# Patient Record
Sex: FEMALE | Race: OTHER | HISPANIC OR LATINO
[De-identification: names, ages, dates, MRNs, and addresses within clinical notes are randomized per-mention and may not be internally consistent; named-entity substitution may affect disease eponyms.]

---

## 2019-12-16 ENCOUNTER — APPOINTMENT (OUTPATIENT)
Dept: OTOLARYNGOLOGY | Facility: CLINIC | Age: 79
End: 2019-12-16
Payer: MEDICARE

## 2019-12-16 VITALS
DIASTOLIC BLOOD PRESSURE: 84 MMHG | SYSTOLIC BLOOD PRESSURE: 151 MMHG | HEART RATE: 85 BPM | BODY MASS INDEX: 33.86 KG/M2 | WEIGHT: 184 LBS | HEIGHT: 62 IN

## 2019-12-16 DIAGNOSIS — Z86.39 PERSONAL HISTORY OF OTHER ENDOCRINE, NUTRITIONAL AND METABOLIC DISEASE: ICD-10-CM

## 2019-12-16 DIAGNOSIS — H90.3 SENSORINEURAL HEARING LOSS, BILATERAL: ICD-10-CM

## 2019-12-16 DIAGNOSIS — Z82.49 FAMILY HISTORY OF ISCHEMIC HEART DISEASE AND OTHER DISEASES OF THE CIRCULATORY SYSTEM: ICD-10-CM

## 2019-12-16 PROCEDURE — 92557 COMPREHENSIVE HEARING TEST: CPT

## 2019-12-16 PROCEDURE — 92567 TYMPANOMETRY: CPT

## 2019-12-16 PROCEDURE — 99204 OFFICE O/P NEW MOD 45 MIN: CPT | Mod: 25

## 2019-12-16 NOTE — REVIEW OF SYSTEMS
[Ear Itch] : ear itch [Ear Pain] : ear pain [Vertigo] : vertigo [Dizziness] : dizziness [Cough] : cough [Negative] : Heme/Lymph [Patient Intake Form Reviewed] : Patient intake form was reviewed [FreeTextEntry1] : cough  joint aches  watery eyes

## 2019-12-16 NOTE — PHYSICAL EXAM
[Midline] : trachea located in midline position [Normal] : no rashes [de-identified] : tender left ear conchae no lesion [] : Romberg test is negative

## 2019-12-16 NOTE — ASSESSMENT
[FreeTextEntry1] : unclear source of as ear pain\par audio wnl a/a tymps\par hx suggests possible bppv\par rec vng study\par ramon scott exercises

## 2019-12-16 NOTE — HISTORY OF PRESENT ILLNESS
[de-identified] : co pain left ear near outside x 1 mo\par dizziness x 2 mo not spinning but like on rocking boat\par w turning head or looking up daily lasts one minute\par no hearing loss, no focal weakness or numbness\par cva x 3 in past residual left arm weakness but resolved

## 2019-12-16 NOTE — CONSULT LETTER
[FreeTextEntry1] : Dear Dr. TOM GIRON,\par \par Thank you for your kind referral. Please refer to my enclosed office notes for HETAL LUNA . If there are any questions free to contact me.\par  [Sincerely,] : Sincerely, [FreeTextEntry3] : Wilmer Dinh MD, FACS\par

## 2020-01-30 ENCOUNTER — APPOINTMENT (OUTPATIENT)
Dept: OTOLARYNGOLOGY | Facility: CLINIC | Age: 80
End: 2020-01-30
Payer: MEDICARE

## 2020-01-30 PROCEDURE — 92537 CALORIC VSTBLR TEST W/REC: CPT

## 2020-01-30 PROCEDURE — 92533 CALORIC VESTIBULAR TEST: CPT

## 2020-02-25 ENCOUNTER — APPOINTMENT (OUTPATIENT)
Dept: NEUROLOGY | Facility: CLINIC | Age: 80
End: 2020-02-25
Payer: MEDICARE

## 2020-02-25 VITALS
WEIGHT: 182 LBS | SYSTOLIC BLOOD PRESSURE: 137 MMHG | HEIGHT: 62 IN | BODY MASS INDEX: 33.49 KG/M2 | HEART RATE: 71 BPM | DIASTOLIC BLOOD PRESSURE: 84 MMHG

## 2020-02-25 DIAGNOSIS — Z83.49 FAMILY HISTORY OF OTHER ENDOCRINE, NUTRITIONAL AND METABOLIC DISEASES: ICD-10-CM

## 2020-02-25 DIAGNOSIS — Z84.89 FAMILY HISTORY OF OTHER SPECIFIED CONDITIONS: ICD-10-CM

## 2020-02-25 DIAGNOSIS — Z83.3 FAMILY HISTORY OF DIABETES MELLITUS: ICD-10-CM

## 2020-02-25 DIAGNOSIS — Z82.3 FAMILY HISTORY OF STROKE: ICD-10-CM

## 2020-02-25 DIAGNOSIS — Z86.79 PERSONAL HISTORY OF OTHER DISEASES OF THE CIRCULATORY SYSTEM: ICD-10-CM

## 2020-02-25 DIAGNOSIS — Z86.73 PERSONAL HISTORY OF TRANSIENT ISCHEMIC ATTACK (TIA), AND CEREBRAL INFARCTION W/OUT RESIDUAL DEFICITS: ICD-10-CM

## 2020-02-25 PROCEDURE — 99204 OFFICE O/P NEW MOD 45 MIN: CPT

## 2020-02-25 NOTE — DISCUSSION/SUMMARY
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

## 2020-02-25 NOTE — PHYSICAL EXAM
[General Appearance - Alert] : alert [General Appearance - In No Acute Distress] : in no acute distress [Oriented To Time, Place, And Person] : oriented to person, place, and time [Affect] : the affect was normal [Person] : oriented to person [Impaired Insight] : insight and judgment were intact [Time] : oriented to time [Place] : oriented to place [Concentration Intact] : normal concentrating ability [Visual Intact] : visual attention was ~T not ~L decreased [Naming Objects] : no difficulty naming common objects [Repeating Phrases] : no difficulty repeating a phrase [Fluency] : fluency intact [Comprehension] : comprehension intact [Writing A Sentence] : no difficulty writing a sentence [Reading] : reading intact [Cranial Nerves Optic (II)] : visual acuity intact bilaterally,  visual fields full to confrontation, pupils equal round and reactive to light [Past History] : adequate knowledge of personal past history [Cranial Nerves Trigeminal (V)] : facial sensation intact symmetrically [Cranial Nerves Vestibulocochlear (VIII)] : hearing was intact bilaterally [Cranial Nerves Oculomotor (III)] : extraocular motion intact [Cranial Nerves Accessory (XI - Cranial And Spinal)] : head turning and shoulder shrug symmetric [Cranial Nerves Glossopharyngeal (IX)] : tongue and palate midline [Cranial Nerves Hypoglossal (XII)] : there was no tongue deviation with protrusion [Cranial Nerves Left Facial: House Grade ___(1-6)] : grade I (100%) facial nerve function [Flattening Of Nasolabial Fold On The Left] : flattening of the  nasolabial fold [No Muscle Atrophy] : normal bulk in all four extremities [Sensation Tactile Decrease] : light touch was intact [2+] : Brachioradialis right 2+ [3+] : Brachioradialis left 3+ [1+] : Patella left 1+ [0] : Ankle jerk right 0 [PERRL With Normal Accommodation] : pupils were equal in size, round, reactive to light, with normal accommodation [Full Visual Field] : full visual field [Pain / Temp Decrease Distal Extremities (Glove & Stocking)] : diminished stocking/glove distribution [Hemiparesis Of Left Side] : hemiparesis was present on the left [Proprioception] : proprioception was intact [Vibration Decrease Leg / Foot Both Ankles] : decreased at both ankles [Vibration Decrease Leg / Foot Toes Both Feet] : decreased at the toes of both feet  [Outer Ear] : the ears and nose were normal in appearance [Extraocular Movements] : extraocular movements were intact [Neck Cervical Mass (___cm)] : no neck mass was observed [Heart Rate And Rhythm] : heart rate was normal and rhythm regular [Auscultation Breath Sounds / Voice Sounds] : lungs were clear to auscultation bilaterally [Heart Sounds] : normal S1 and S2 [Arterial Pulses Carotid] : carotid pulses were normal with no bruits [Involuntary Movements] : no involuntary movements were seen [Edema] : there was no peripheral edema [] : no rash [FreeTextEntry1] : obese [Past-pointing] : there was no past-pointing [Tremor] : no tremor present [Plantar Reflex Right Only] : normal on the right [Plantar Reflex Left Only] : normal on the left [FreeTextEntry6] : Motor: Right upper and lower extremity 5/5, left spastic hemiparesis with significant restriction of left upper extremity proximal movements [FreeTextEntry8] : Unstable; Mild circumduction gait

## 2020-02-25 NOTE — REVIEW OF SYSTEMS
[Arm Weakness] : arm weakness [Leg Weakness] : leg weakness [Poor Coordination] : poor coordination [Dizziness] : dizziness [Earache] : earache [Lightheadedness] : lightheadedness [Joint Pain] : joint pain [Constipation] : constipation [Negative] : Integumentary

## 2020-02-25 NOTE — HISTORY OF PRESENT ILLNESS
[FreeTextEntry1] : 79-year-old right-handed  female with past medical history remarkable for hyperlipidemia, hypertension and hypothyroidism; has come in accompanied by her son for evaluation of intermittent dizziness, left ear pain and abnormal VNG results suggestive of central etiology.\par \par Patient reports that since past 3 months, she has been experiencing dizziness, dizziness usually occurs when she turns on her side from supine position, intermittently it occurs in sitting position with neck movements, the episodes brief, last few seconds, resolved spontaneously, have been occurring few times almost daily. Patient denies dizziness while bending, denies associated spinning sensation, tinnitus, nausea, vomiting, hearing loss, double vision or visual blurring. She does admit that since past 2 months she has also been having left-sided ear ache, she was seen by Dr. Dinh ENT specialist, audiological testing and VNG were done, report is suggestive of central etiology.\par \par Upon obtaining further history, patient has left arm proximal weakness with severely restricted shoulder ROM, does not recall details but being recalls being admitted to [facility] with stroke 4 years ago, since then she has been having mildly unstable gait, patient was on aspirin, apparently discontinued it as it caused gastric irritation.

## 2020-02-25 NOTE — HISTORY OF PRESENT ILLNESS
[FreeTextEntry1] : 79-year-old right-handed  female with past medical history remarkable for hyperlipidemia, hypertension and hypothyroidism; has come in accompanied by her son for evaluation of intermittent dizziness, left ear pain and abnormal VNG results suggestive of central etiology.\par \par Patient reports that since past 3 months, she has been experiencing dizziness, dizziness usually occurs when she turns on her side from supine position, intermittently it occurs in sitting position with neck movements, the episodes brief, last few seconds, resolved spontaneously, have been occurring few times almost daily. Patient denies dizziness while bending, denies associated spinning sensation, tinnitus, nausea, vomiting, hearing loss, double vision or visual blurring. She does admit that since past 2 months she has also been having left-sided ear ache, she was seen by Dr. Dinh ENT specialist, audiological testing and VNG were done, report is suggestive of central etiology.\par \par Upon obtaining further history, patient has left arm proximal weakness with severely restricted shoulder ROM, does not recall details but being recalls being admitted to [facility] with stroke 4 years ago, since then she has been having mildly unstable gait, patient was on aspirin, apparently discontinued it as it caused gastric irritation.

## 2020-02-25 NOTE — PHYSICAL EXAM
[General Appearance - Alert] : alert [Oriented To Time, Place, And Person] : oriented to person, place, and time [General Appearance - In No Acute Distress] : in no acute distress [Person] : oriented to person [Impaired Insight] : insight and judgment were intact [Affect] : the affect was normal [Place] : oriented to place [Time] : oriented to time [Concentration Intact] : normal concentrating ability [Visual Intact] : visual attention was ~T not ~L decreased [Naming Objects] : no difficulty naming common objects [Repeating Phrases] : no difficulty repeating a phrase [Fluency] : fluency intact [Comprehension] : comprehension intact [Writing A Sentence] : no difficulty writing a sentence [Cranial Nerves Optic (II)] : visual acuity intact bilaterally,  visual fields full to confrontation, pupils equal round and reactive to light [Reading] : reading intact [Past History] : adequate knowledge of personal past history [Cranial Nerves Trigeminal (V)] : facial sensation intact symmetrically [Cranial Nerves Vestibulocochlear (VIII)] : hearing was intact bilaterally [Cranial Nerves Oculomotor (III)] : extraocular motion intact [Cranial Nerves Accessory (XI - Cranial And Spinal)] : head turning and shoulder shrug symmetric [Cranial Nerves Hypoglossal (XII)] : there was no tongue deviation with protrusion [Cranial Nerves Glossopharyngeal (IX)] : tongue and palate midline [Flattening Of Nasolabial Fold On The Left] : flattening of the  nasolabial fold [Cranial Nerves Left Facial: House Grade ___(1-6)] : grade I (100%) facial nerve function [Sensation Tactile Decrease] : light touch was intact [No Muscle Atrophy] : normal bulk in all four extremities [2+] : Brachioradialis right 2+ [3+] : Brachioradialis left 3+ [1+] : Patella left 1+ [0] : Ankle jerk right 0 [Full Visual Field] : full visual field [PERRL With Normal Accommodation] : pupils were equal in size, round, reactive to light, with normal accommodation [Proprioception] : proprioception was intact [Vibration Decrease Leg / Foot Both Ankles] : decreased at both ankles [Hemiparesis Of Left Side] : hemiparesis was present on the left [Pain / Temp Decrease Distal Extremities (Glove & Stocking)] : diminished stocking/glove distribution [Vibration Decrease Leg / Foot Toes Both Feet] : decreased at the toes of both feet  [Outer Ear] : the ears and nose were normal in appearance [Extraocular Movements] : extraocular movements were intact [Neck Cervical Mass (___cm)] : no neck mass was observed [Heart Rate And Rhythm] : heart rate was normal and rhythm regular [Auscultation Breath Sounds / Voice Sounds] : lungs were clear to auscultation bilaterally [Involuntary Movements] : no involuntary movements were seen [Heart Sounds] : normal S1 and S2 [Arterial Pulses Carotid] : carotid pulses were normal with no bruits [Edema] : there was no peripheral edema [] : no rash [FreeTextEntry1] : obese [Past-pointing] : there was no past-pointing [Tremor] : no tremor present [Plantar Reflex Right Only] : normal on the right [Plantar Reflex Left Only] : normal on the left [FreeTextEntry6] : Motor: Right upper and lower extremity 5/5, left spastic hemiparesis with significant restriction of left upper extremity proximal movements [FreeTextEntry8] : Unstable; Mild circumduction gait

## 2020-02-25 NOTE — DATA REVIEWED
[de-identified] : 2/25/13: MRI brain with/without contrast: acute lacunar infarction in the posterior right corona radiata extending into the right cortico- spinal tract. Scattered punctate foci of hemosiderin in the left cerebellum and bilateral thalami likely due to prior subclinical hypertensive microhemorrhages, old lacunar infarction in the left cerebellum and thalami [de-identified] : Labs:1/28/20: A1c 7.4, B12 283, total cholesterol 196, HDL 46, \par 1/30/20: VNG - suggestive of central pathology

## 2020-02-25 NOTE — DATA REVIEWED
[de-identified] : Labs:1/28/20: A1c 7.4, B12 283, total cholesterol 196, HDL 46, \par 1/30/20: VNG - suggestive of central pathology [de-identified] : 2/25/13: MRI brain with/without contrast: acute lacunar infarction in the posterior right corona radiata extending into the right cortico- spinal tract. Scattered punctate foci of hemosiderin in the left cerebellum and bilateral thalami likely due to prior subclinical hypertensive microhemorrhages, old lacunar infarction in the left cerebellum and thalami

## 2020-02-25 NOTE — REASON FOR VISIT
[Consultation] : a consultation visit [Family Member] : family member [FreeTextEntry1] : Referred by Dr. Rojas for evaluation of dizziness and left ear pain

## 2020-02-25 NOTE — REVIEW OF SYSTEMS
[Arm Weakness] : arm weakness [Leg Weakness] : leg weakness [Poor Coordination] : poor coordination [Lightheadedness] : lightheadedness [Earache] : earache [Dizziness] : dizziness [Joint Pain] : joint pain [Constipation] : constipation [Negative] : Integumentary

## 2020-03-06 ENCOUNTER — FORM ENCOUNTER (OUTPATIENT)
Age: 80
End: 2020-03-06

## 2020-03-07 ENCOUNTER — OUTPATIENT (OUTPATIENT)
Dept: OUTPATIENT SERVICES | Facility: HOSPITAL | Age: 80
LOS: 1 days | End: 2020-03-07
Payer: MEDICARE

## 2020-03-07 ENCOUNTER — APPOINTMENT (OUTPATIENT)
Dept: MRI IMAGING | Facility: CLINIC | Age: 80
End: 2020-03-07
Payer: MEDICARE

## 2020-03-07 DIAGNOSIS — Z00.8 ENCOUNTER FOR OTHER GENERAL EXAMINATION: ICD-10-CM

## 2020-03-07 PROCEDURE — 70544 MR ANGIOGRAPHY HEAD W/O DYE: CPT | Mod: 26,59

## 2020-03-07 PROCEDURE — 70544 MR ANGIOGRAPHY HEAD W/O DYE: CPT

## 2020-03-07 PROCEDURE — 70551 MRI BRAIN STEM W/O DYE: CPT | Mod: 26

## 2020-03-07 PROCEDURE — 70551 MRI BRAIN STEM W/O DYE: CPT

## 2020-05-18 ENCOUNTER — APPOINTMENT (OUTPATIENT)
Dept: NEUROLOGY | Facility: CLINIC | Age: 80
End: 2020-05-18
Payer: MEDICARE

## 2020-05-18 PROCEDURE — 99442: CPT

## 2021-01-05 ENCOUNTER — TRANSCRIPTION ENCOUNTER (OUTPATIENT)
Age: 81
End: 2021-01-05

## 2021-08-13 ENCOUNTER — APPOINTMENT (OUTPATIENT)
Dept: FAMILY MEDICINE | Facility: CLINIC | Age: 81
End: 2021-08-13
Payer: MEDICARE

## 2021-08-13 ENCOUNTER — NON-APPOINTMENT (OUTPATIENT)
Age: 81
End: 2021-08-13

## 2021-08-13 VITALS
OXYGEN SATURATION: 89 % | BODY MASS INDEX: 32.2 KG/M2 | TEMPERATURE: 98.1 F | WEIGHT: 175 LBS | SYSTOLIC BLOOD PRESSURE: 137 MMHG | DIASTOLIC BLOOD PRESSURE: 84 MMHG | HEIGHT: 62 IN | HEART RATE: 79 BPM

## 2021-08-13 DIAGNOSIS — G89.29 PAIN IN RIGHT KNEE: ICD-10-CM

## 2021-08-13 DIAGNOSIS — M25.561 PAIN IN RIGHT KNEE: ICD-10-CM

## 2021-08-13 DIAGNOSIS — Z63.4 DISAPPEARANCE AND DEATH OF FAMILY MEMBER: ICD-10-CM

## 2021-08-13 DIAGNOSIS — K25.6 CHRONIC OR UNSPECIFIED GASTRIC ULCER WITH BOTH HEMORRHAGE AND PERFORATION: ICD-10-CM

## 2021-08-13 DIAGNOSIS — M25.562 PAIN IN RIGHT KNEE: ICD-10-CM

## 2021-08-13 PROCEDURE — 99203 OFFICE O/P NEW LOW 30 MIN: CPT

## 2021-08-13 RX ORDER — ENALAPRIL MALEATE 20 MG/1
20 TABLET ORAL
Refills: 0 | Status: DISCONTINUED | COMMUNITY
End: 2021-08-13

## 2021-08-13 RX ORDER — ASPIRIN 81 MG/1
81 TABLET ORAL DAILY
Qty: 30 | Refills: 0 | Status: DISCONTINUED | COMMUNITY
Start: 2020-02-25 | End: 2021-08-13

## 2021-08-13 RX ORDER — MECLIZINE HYDROCHLORIDE 12.5 MG/1
12.5 TABLET ORAL
Refills: 0 | Status: DISCONTINUED | COMMUNITY
End: 2021-08-13

## 2021-08-13 SDOH — SOCIAL STABILITY - SOCIAL INSECURITY: DISSAPEARANCE AND DEATH OF FAMILY MEMBER: Z63.4

## 2021-08-13 NOTE — HEALTH RISK ASSESSMENT
[1 or 2 (0 pts)] : 1 or 2 (0 points) [Never (0 pts)] : Never (0 points) [No] : In the past 12 months have you used drugs other than those required for medical reasons? No [Two or more falls in past year] : Patient reported two or more falls in the past year [0] : 1) Little interest or pleasure doing things: Not at all (0) [1] : 2) Feeling down, depressed, or hopeless for several days (1) [] : No [Audit-CScore] : 0 [[id number]] : 1

## 2021-08-13 NOTE — HISTORY OF PRESENT ILLNESS
[Other: _____] : [unfilled] [FreeTextEntry8] : Chrissie Foss is a 81 year old Mosotho speaking female here to establish care. Daughter states her previous PCP was Erick Rojas in [place].\par

## 2021-08-13 NOTE — PLAN
[FreeTextEntry1] : Plan for CPE when due. Patient gets routine blood work every 3 months. Next due in few weeks as per patient's daughter.

## 2021-09-03 ENCOUNTER — APPOINTMENT (OUTPATIENT)
Dept: FAMILY MEDICINE | Facility: CLINIC | Age: 81
End: 2021-09-03
Payer: MEDICARE

## 2021-09-03 VITALS
HEIGHT: 62 IN | DIASTOLIC BLOOD PRESSURE: 86 MMHG | OXYGEN SATURATION: 97 % | WEIGHT: 175 LBS | HEART RATE: 74 BPM | TEMPERATURE: 98 F | BODY MASS INDEX: 32.2 KG/M2 | SYSTOLIC BLOOD PRESSURE: 165 MMHG

## 2021-09-03 PROCEDURE — 99214 OFFICE O/P EST MOD 30 MIN: CPT | Mod: 25

## 2021-09-09 LAB
ALBUMIN SERPL ELPH-MCNC: 4.4 G/DL
ALP BLD-CCNC: 109 U/L
ALT SERPL-CCNC: 6 U/L
ANION GAP SERPL CALC-SCNC: 11 MMOL/L
AST SERPL-CCNC: 13 U/L
BILIRUB SERPL-MCNC: 0.2 MG/DL
BUN SERPL-MCNC: 9 MG/DL
CALCIUM SERPL-MCNC: 10.2 MG/DL
CHLORIDE SERPL-SCNC: 101 MMOL/L
CO2 SERPL-SCNC: 27 MMOL/L
CREAT SERPL-MCNC: 0.61 MG/DL
CREAT SPEC-SCNC: 88 MG/DL
ESTIMATED AVERAGE GLUCOSE: 160 MG/DL
GLUCOSE SERPL-MCNC: 127 MG/DL
HBA1C MFR BLD HPLC: 7.2 %
MICROALBUMIN 24H UR DL<=1MG/L-MCNC: 1.3 MG/DL
MICROALBUMIN/CREAT 24H UR-RTO: 14 MG/G
POTASSIUM SERPL-SCNC: 4.7 MMOL/L
PROT SERPL-MCNC: 7.4 G/DL
SODIUM SERPL-SCNC: 139 MMOL/L

## 2021-09-09 NOTE — HISTORY OF PRESENT ILLNESS
[Most Recent A1C: ___] : Most recent A1C was [unfilled] [Does not check BP] : The patient is not checking blood pressure [Target goal not met] : BP target goal not met [Diabetes Mellitus] : Diabetes Mellitus [Hypertension] : Hypertension [Other: _____] : [unfilled] [de-identified] : Taking Metformin 500mg daily in the morning.  [de-identified] : Currently taking Enalapril 5mg 2x/daily.

## 2021-09-10 ENCOUNTER — APPOINTMENT (OUTPATIENT)
Dept: FAMILY MEDICINE | Facility: CLINIC | Age: 81
End: 2021-09-10
Payer: MEDICARE

## 2021-09-10 VITALS
SYSTOLIC BLOOD PRESSURE: 138 MMHG | BODY MASS INDEX: 32.2 KG/M2 | DIASTOLIC BLOOD PRESSURE: 82 MMHG | WEIGHT: 175 LBS | HEART RATE: 99 BPM | HEIGHT: 62 IN | OXYGEN SATURATION: 97 % | TEMPERATURE: 98.4 F

## 2021-09-10 PROCEDURE — 99213 OFFICE O/P EST LOW 20 MIN: CPT

## 2021-09-10 NOTE — PLAN
[FreeTextEntry1] : Continue current medications. \par Follow up in 3 months for  blood work (CBC, A1C, CMP, Lipid, TSH, U/A)\par Counseling provided on diabetic diet.

## 2021-12-03 ENCOUNTER — APPOINTMENT (OUTPATIENT)
Dept: FAMILY MEDICINE | Facility: CLINIC | Age: 81
End: 2021-12-03
Payer: MEDICARE

## 2021-12-03 ENCOUNTER — NON-APPOINTMENT (OUTPATIENT)
Age: 81
End: 2021-12-03

## 2021-12-03 VITALS
BODY MASS INDEX: 32.2 KG/M2 | HEIGHT: 62 IN | OXYGEN SATURATION: 98 % | DIASTOLIC BLOOD PRESSURE: 92 MMHG | SYSTOLIC BLOOD PRESSURE: 154 MMHG | WEIGHT: 175 LBS | HEART RATE: 82 BPM | TEMPERATURE: 98.1 F

## 2021-12-03 VITALS — SYSTOLIC BLOOD PRESSURE: 130 MMHG | DIASTOLIC BLOOD PRESSURE: 85 MMHG

## 2021-12-03 PROCEDURE — 36415 COLL VENOUS BLD VENIPUNCTURE: CPT

## 2021-12-03 PROCEDURE — 81003 URINALYSIS AUTO W/O SCOPE: CPT | Mod: QW

## 2021-12-03 PROCEDURE — 99214 OFFICE O/P EST MOD 30 MIN: CPT | Mod: 25

## 2021-12-03 PROCEDURE — 93000 ELECTROCARDIOGRAM COMPLETE: CPT

## 2021-12-03 RX ORDER — GABAPENTIN 300 MG/1
300 CAPSULE ORAL TWICE DAILY
Refills: 0 | Status: DISCONTINUED | COMMUNITY
End: 2021-12-03

## 2021-12-05 LAB
BILIRUB UR QL STRIP: NORMAL
CLARITY UR: CLEAR
GLUCOSE UR-MCNC: NORMAL
HCG UR QL: 1 EU/DL
HGB UR QL STRIP.AUTO: NORMAL
KETONES UR-MCNC: NORMAL
LEUKOCYTE ESTERASE UR QL STRIP: NORMAL
NITRITE UR QL STRIP: NORMAL
PH UR STRIP: 7
PROT UR STRIP-MCNC: NORMAL
SP GR UR STRIP: 1.02

## 2021-12-06 LAB
ALBUMIN SERPL ELPH-MCNC: 4 G/DL
ALP BLD-CCNC: 114 U/L
ALT SERPL-CCNC: 6 U/L
ANION GAP SERPL CALC-SCNC: 12 MMOL/L
APPEARANCE: ABNORMAL
AST SERPL-CCNC: 9 U/L
BACTERIA: ABNORMAL
BASOPHILS # BLD AUTO: 0.04 K/UL
BASOPHILS NFR BLD AUTO: 0.5 %
BILIRUB SERPL-MCNC: 0.2 MG/DL
BILIRUBIN URINE: NEGATIVE
BLOOD URINE: NEGATIVE
BUN SERPL-MCNC: 9 MG/DL
CALCIUM SERPL-MCNC: 9.6 MG/DL
CHLORIDE SERPL-SCNC: 103 MMOL/L
CO2 SERPL-SCNC: 24 MMOL/L
COLOR: YELLOW
CREAT SERPL-MCNC: 0.65 MG/DL
CREAT SPEC-SCNC: 141 MG/DL
EOSINOPHIL # BLD AUTO: 0.62 K/UL
EOSINOPHIL NFR BLD AUTO: 7.5 %
ESTIMATED AVERAGE GLUCOSE: 163 MG/DL
GLUCOSE QUALITATIVE U: NEGATIVE
GLUCOSE SERPL-MCNC: 146 MG/DL
HBA1C MFR BLD HPLC: 7.3 %
HCT VFR BLD CALC: 42.9 %
HGB BLD-MCNC: 13 G/DL
HYALINE CASTS: 2 /LPF
IMM GRANULOCYTES NFR BLD AUTO: 0.2 %
KETONES URINE: NEGATIVE
LEUKOCYTE ESTERASE URINE: NEGATIVE
LYMPHOCYTES # BLD AUTO: 1.4 K/UL
LYMPHOCYTES NFR BLD AUTO: 17 %
MAN DIFF?: NORMAL
MCHC RBC-ENTMCNC: 25.9 PG
MCHC RBC-ENTMCNC: 30.3 GM/DL
MCV RBC AUTO: 85.5 FL
MICROALBUMIN 24H UR DL<=1MG/L-MCNC: <1.2 MG/DL
MICROALBUMIN/CREAT 24H UR-RTO: NORMAL MG/G
MICROSCOPIC-UA: NORMAL
MONOCYTES # BLD AUTO: 0.62 K/UL
MONOCYTES NFR BLD AUTO: 7.5 %
NEUTROPHILS # BLD AUTO: 5.54 K/UL
NEUTROPHILS NFR BLD AUTO: 67.3 %
NITRITE URINE: NEGATIVE
PH URINE: 7.5
PLATELET # BLD AUTO: 305 K/UL
POTASSIUM SERPL-SCNC: 4.5 MMOL/L
PROT SERPL-MCNC: 7 G/DL
PROTEIN URINE: NORMAL
RBC # BLD: 5.02 M/UL
RBC # FLD: 19.3 %
RED BLOOD CELLS URINE: 1 /HPF
SODIUM SERPL-SCNC: 139 MMOL/L
SPECIFIC GRAVITY URINE: 1.02
SQUAMOUS EPITHELIAL CELLS: 1 /HPF
UROBILINOGEN URINE: NORMAL
WBC # FLD AUTO: 8.24 K/UL
WHITE BLOOD CELLS URINE: 1 /HPF

## 2021-12-06 NOTE — CURRENT MEDS
[Takes medication as prescribed] : takes [FreeTextEntry1] : Forgot to take medications this morning.

## 2021-12-06 NOTE — PLAN
[FreeTextEntry1] : -Patient optimized for surgery pending cardiac clearance.\par -Patient advised to remain NPO after midnight prior to surgery. \par -Risks/benefits of surgery have been discussed by operative physician. \par -Patient has been advised to avoid aspirin or aspirin containing products from now until surgery. \par -Thank you for including me in the care of your patient.\par

## 2021-12-06 NOTE — RESULTS/DATA
[] : results reviewed [de-identified] : abnormal, paced [de-identified] : Requested most recent cardio chart. Seen by cardio 4/23/2021. 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

## 2021-12-06 NOTE — ASSESSMENT
[No Further Testing Recommended] : no further testing recommended [Patient NOT optimized for Surgery at this time] : Patient not optimized for surgery at this time [Cardiology consultation] : Cardiology consultation

## 2021-12-06 NOTE — CONSULT LETTER
[Dear  ___] : Dear ~BIGG, [Consult Letter:] : I had the pleasure of evaluating your patient, [unfilled]. [Please see my note below.] : Please see my note below. [Consult Closing:] : Thank you very much for allowing me to participate in the care of this patient.  If you have any questions, please do not hesitate to contact me. [Sincerely,] : Sincerely, [FreeTextEntry3] : Andreas Quintanilla DO\par Diplomate of [employer]\par  at [employer] at [facility]\par

## 2021-12-06 NOTE — HISTORY OF PRESENT ILLNESS
[Implantable Device/Pacemaker] : implantable device/pacemaker [No Pertinent Pulmonary History] : no history of asthma, COPD, sleep apnea, or smoking [No Adverse Anesthesia Reaction] : no adverse anesthesia reaction in self or family member [(Patient denies any chest pain, claudication, dyspnea on exertion, orthopnea, palpitations or syncope)] : Patient denies any chest pain, claudication, dyspnea on exertion, orthopnea, palpitations or syncope [Aortic Stenosis] : no aortic stenosis [Atrial Fibrillation] : no atrial fibrillation [Coronary Artery Disease] : no coronary artery disease [Recent Myocardial Infarction] : no recent myocardial infarction [FreeTextEntry1] : Upper Endoscopy  [FreeTextEntry2] : 12/10/2021 [FreeTextEntry3] : Dr. Barrett [FreeTextEntry4] : Cardio: Kamran Hale

## 2021-12-27 ENCOUNTER — TRANSCRIPTION ENCOUNTER (OUTPATIENT)
Age: 81
End: 2021-12-27

## 2022-02-04 ENCOUNTER — APPOINTMENT (OUTPATIENT)
Dept: FAMILY MEDICINE | Facility: CLINIC | Age: 82
End: 2022-02-04
Payer: MEDICARE

## 2022-02-04 VITALS
WEIGHT: 175 LBS | BODY MASS INDEX: 32.2 KG/M2 | DIASTOLIC BLOOD PRESSURE: 95 MMHG | TEMPERATURE: 97.8 F | HEIGHT: 62 IN | SYSTOLIC BLOOD PRESSURE: 161 MMHG | OXYGEN SATURATION: 98 % | HEART RATE: 84 BPM

## 2022-02-04 LAB
BILIRUB UR QL STRIP: NORMAL
CLARITY UR: CLEAR
COLLECTION METHOD: NORMAL
GLUCOSE UR-MCNC: NORMAL
HCG UR QL: 0.2 EU/DL
HGB UR QL STRIP.AUTO: NORMAL
KETONES UR-MCNC: NORMAL
LEUKOCYTE ESTERASE UR QL STRIP: NORMAL
NITRITE UR QL STRIP: NORMAL
PH UR STRIP: 7
PROT UR STRIP-MCNC: NORMAL
SP GR UR STRIP: 1.02

## 2022-02-04 PROCEDURE — 99213 OFFICE O/P EST LOW 20 MIN: CPT | Mod: 25

## 2022-02-04 PROCEDURE — 36415 COLL VENOUS BLD VENIPUNCTURE: CPT

## 2022-02-04 PROCEDURE — 81003 URINALYSIS AUTO W/O SCOPE: CPT | Mod: QW

## 2022-02-11 ENCOUNTER — NON-APPOINTMENT (OUTPATIENT)
Age: 82
End: 2022-02-11

## 2022-02-11 LAB
ALBUMIN SERPL ELPH-MCNC: 4.7 G/DL
ALP BLD-CCNC: 120 U/L
ALT SERPL-CCNC: 10 U/L
ANION GAP SERPL CALC-SCNC: 13 MMOL/L
APPEARANCE: CLEAR
AST SERPL-CCNC: 18 U/L
BACTERIA: NEGATIVE
BASOPHILS # BLD AUTO: 0.05 K/UL
BASOPHILS NFR BLD AUTO: 0.6 %
BILIRUB SERPL-MCNC: 0.4 MG/DL
BILIRUBIN URINE: NEGATIVE
BLOOD URINE: NEGATIVE
BUN SERPL-MCNC: 7 MG/DL
CALCIUM OXALATE CRYSTALS: NEGATIVE
CALCIUM SERPL-MCNC: 9.8 MG/DL
CHLORIDE SERPL-SCNC: 100 MMOL/L
CHOLEST SERPL-MCNC: 147 MG/DL
CO2 SERPL-SCNC: 27 MMOL/L
COLOR: NORMAL
CREAT SERPL-MCNC: 0.62 MG/DL
CREAT SPEC-SCNC: 100 MG/DL
EOSINOPHIL # BLD AUTO: 0.09 K/UL
EOSINOPHIL NFR BLD AUTO: 1.1 %
ESTIMATED AVERAGE GLUCOSE: 171 MG/DL
GLUCOSE QUALITATIVE U: NEGATIVE
GLUCOSE SERPL-MCNC: 142 MG/DL
GRANULAR CASTS: 0 /LPF
HBA1C MFR BLD HPLC: 7.6 %
HCT VFR BLD CALC: 43 %
HDLC SERPL-MCNC: 43 MG/DL
HGB BLD-MCNC: 13.1 G/DL
HYALINE CASTS: 0 /LPF
IMM GRANULOCYTES NFR BLD AUTO: 0.2 %
KETONES URINE: NEGATIVE
LDLC SERPL CALC-MCNC: 81 MG/DL
LEUKOCYTE ESTERASE URINE: NEGATIVE
LYMPHOCYTES # BLD AUTO: 1.6 K/UL
LYMPHOCYTES NFR BLD AUTO: 18.9 %
MAN DIFF?: NORMAL
MCHC RBC-ENTMCNC: 26.3 PG
MCHC RBC-ENTMCNC: 30.5 GM/DL
MCV RBC AUTO: 86.2 FL
MICROALBUMIN 24H UR DL<=1MG/L-MCNC: 1.8 MG/DL
MICROALBUMIN/CREAT 24H UR-RTO: 17 MG/G
MICROSCOPIC-UA: NORMAL
MONOCYTES # BLD AUTO: 0.65 K/UL
MONOCYTES NFR BLD AUTO: 7.7 %
NEUTROPHILS # BLD AUTO: 6.04 K/UL
NEUTROPHILS NFR BLD AUTO: 71.5 %
NITRITE URINE: NEGATIVE
NONHDLC SERPL-MCNC: 104 MG/DL
PH URINE: 7
PLATELET # BLD AUTO: 321 K/UL
POTASSIUM SERPL-SCNC: 4.3 MMOL/L
PROT SERPL-MCNC: 7.6 G/DL
PROTEIN URINE: NEGATIVE
RBC # BLD: 4.99 M/UL
RBC # FLD: 16.4 %
RED BLOOD CELLS URINE: 2 /HPF
SODIUM SERPL-SCNC: 140 MMOL/L
SPECIFIC GRAVITY URINE: 1.01
SQUAMOUS EPITHELIAL CELLS: 4 /HPF
T3FREE SERPL-MCNC: 3.02 PG/ML
T4 FREE SERPL-MCNC: 2 NG/DL
TRIGL SERPL-MCNC: 114 MG/DL
TRIPLE PHOSPHATE CRYSTALS: NEGATIVE
TSH SERPL-ACNC: 0.25 UIU/ML
URIC ACID CRYSTALS: NEGATIVE
URINE COMMENTS: NORMAL
UROBILINOGEN URINE: NORMAL
WBC # FLD AUTO: 8.45 K/UL
WHITE BLOOD CELLS URINE: 6 /HPF

## 2022-02-24 ENCOUNTER — RX RENEWAL (OUTPATIENT)
Age: 82
End: 2022-02-24

## 2022-03-22 ENCOUNTER — RX RENEWAL (OUTPATIENT)
Age: 82
End: 2022-03-22

## 2022-05-06 ENCOUNTER — APPOINTMENT (OUTPATIENT)
Dept: FAMILY MEDICINE | Facility: CLINIC | Age: 82
End: 2022-05-06
Payer: MEDICARE

## 2022-05-06 VITALS
SYSTOLIC BLOOD PRESSURE: 153 MMHG | BODY MASS INDEX: 32.2 KG/M2 | OXYGEN SATURATION: 100 % | HEIGHT: 62 IN | HEART RATE: 77 BPM | TEMPERATURE: 98.4 F | DIASTOLIC BLOOD PRESSURE: 96 MMHG | WEIGHT: 175 LBS

## 2022-05-06 DIAGNOSIS — H68.023 CHRONIC EUSTACHIAN SALPINGITIS, BILATERAL: ICD-10-CM

## 2022-05-06 DIAGNOSIS — Z01.818 ENCOUNTER FOR OTHER PREPROCEDURAL EXAMINATION: ICD-10-CM

## 2022-05-06 DIAGNOSIS — H92.02 OTALGIA, LEFT EAR: ICD-10-CM

## 2022-05-06 LAB
BILIRUB UR QL STRIP: NORMAL
GLUCOSE UR-MCNC: NORMAL
HCG UR QL: 0.2 EU/DL
HGB UR QL STRIP.AUTO: NORMAL
KETONES UR-MCNC: NORMAL
LEUKOCYTE ESTERASE UR QL STRIP: ABNORMAL
NITRITE UR QL STRIP: NORMAL
PH UR STRIP: 7
PROT UR STRIP-MCNC: NORMAL
SP GR UR STRIP: 1.01

## 2022-05-06 PROCEDURE — 81003 URINALYSIS AUTO W/O SCOPE: CPT | Mod: QW

## 2022-05-06 PROCEDURE — 99214 OFFICE O/P EST MOD 30 MIN: CPT | Mod: 25

## 2022-05-06 PROCEDURE — 36415 COLL VENOUS BLD VENIPUNCTURE: CPT

## 2022-05-06 RX ORDER — METFORMIN HYDROCHLORIDE 500 MG/1
500 TABLET, COATED ORAL
Refills: 0 | Status: DISCONTINUED | COMMUNITY
End: 2022-05-06

## 2022-05-06 NOTE — HEALTH RISK ASSESSMENT
[Never] : Never [0-4] : 0-4 [1 or 2 (0 pts)] : 1 or 2 (0 points) [Never (0 pts)] : Never (0 points) [No] : In the past 12 months have you used drugs other than those required for medical reasons? No [No falls in past year] : Patient reported no falls in the past year [0] : 2) Feeling down, depressed, or hopeless: Not at all (0) [Audit-CScore] : 0 [[id number]] : 0

## 2022-05-06 NOTE — HISTORY OF PRESENT ILLNESS
[Diabetes Mellitus] : Diabetes Mellitus [Hypertension] : Hypertension [FreeTextEntry6] : pt is here for DM check [No episodes] : No hypoglycemic episodes since the last visit. [Does not check] : Patient does not check blood glucose regularly [Regular podiatrist visits] : Patient did not have regular podiatrist visit [Understanding of foot care] : Patient expressed understanding of foot care [Most Recent A1C: ___] : Most recent A1C was [unfilled] [At intermediate goal] : A1C at intermediate goal [Moderate Intensity] : Patient is currently on moderate intensity statin  therapy [Checks BP Sporadically] : The patient checks ~his/her~ blood pressure sporadically [<140/90] : Target blood pressure is <140/90 [Target goal met] : BP target goal met

## 2022-05-08 LAB
ALBUMIN SERPL ELPH-MCNC: 4.6 G/DL
ALP BLD-CCNC: 111 U/L
ALT SERPL-CCNC: 5 U/L
ANION GAP SERPL CALC-SCNC: 15 MMOL/L
AST SERPL-CCNC: 13 U/L
BILIRUB SERPL-MCNC: 0.3 MG/DL
BUN SERPL-MCNC: 9 MG/DL
CALCIUM SERPL-MCNC: 10.3 MG/DL
CHLORIDE SERPL-SCNC: 101 MMOL/L
CHOLEST SERPL-MCNC: 147 MG/DL
CO2 SERPL-SCNC: 26 MMOL/L
CREAT SERPL-MCNC: 0.61 MG/DL
CREAT SPEC-SCNC: 68 MG/DL
EGFR: 90 ML/MIN/1.73M2
ESTIMATED AVERAGE GLUCOSE: 163 MG/DL
GLUCOSE SERPL-MCNC: 136 MG/DL
HBA1C MFR BLD HPLC: 7.3 %
HDLC SERPL-MCNC: 40 MG/DL
LDLC SERPL CALC-MCNC: 76 MG/DL
MICROALBUMIN 24H UR DL<=1MG/L-MCNC: <1.2 MG/DL
MICROALBUMIN/CREAT 24H UR-RTO: NORMAL MG/G
NONHDLC SERPL-MCNC: 107 MG/DL
POTASSIUM SERPL-SCNC: 5 MMOL/L
PROT SERPL-MCNC: 7.6 G/DL
SODIUM SERPL-SCNC: 141 MMOL/L
TRIGL SERPL-MCNC: 157 MG/DL

## 2022-05-15 ENCOUNTER — RX RENEWAL (OUTPATIENT)
Age: 82
End: 2022-05-15

## 2022-05-17 ENCOUNTER — NON-APPOINTMENT (OUTPATIENT)
Age: 82
End: 2022-05-17

## 2022-05-17 LAB
T3FREE SERPL-MCNC: 3.58 PG/ML
T4 FREE SERPL-MCNC: 2.2 NG/DL
TSH SERPL-ACNC: 0.01 UIU/ML

## 2022-05-18 ENCOUNTER — NON-APPOINTMENT (OUTPATIENT)
Age: 82
End: 2022-05-18

## 2022-05-26 ENCOUNTER — RX RENEWAL (OUTPATIENT)
Age: 82
End: 2022-05-26

## 2022-05-31 ENCOUNTER — NON-APPOINTMENT (OUTPATIENT)
Age: 82
End: 2022-05-31

## 2022-06-01 ENCOUNTER — RX CHANGE (OUTPATIENT)
Age: 82
End: 2022-06-01

## 2022-06-01 RX ORDER — METFORMIN ER 500 MG 500 MG/1
500 TABLET ORAL
Qty: 60 | Refills: 2 | Status: DISCONTINUED | COMMUNITY
Start: 2022-05-06 | End: 2022-06-01

## 2022-06-03 ENCOUNTER — APPOINTMENT (OUTPATIENT)
Dept: FAMILY MEDICINE | Facility: CLINIC | Age: 82
End: 2022-06-03
Payer: MEDICARE

## 2022-06-03 VITALS
WEIGHT: 175 LBS | SYSTOLIC BLOOD PRESSURE: 135 MMHG | HEART RATE: 80 BPM | OXYGEN SATURATION: 96 % | DIASTOLIC BLOOD PRESSURE: 83 MMHG | BODY MASS INDEX: 32.01 KG/M2 | TEMPERATURE: 97.9 F

## 2022-06-03 DIAGNOSIS — S09.90XA UNSPECIFIED INJURY OF HEAD, INITIAL ENCOUNTER: ICD-10-CM

## 2022-06-03 PROCEDURE — 99213 OFFICE O/P EST LOW 20 MIN: CPT

## 2022-06-07 ENCOUNTER — OUTPATIENT (OUTPATIENT)
Dept: OUTPATIENT SERVICES | Facility: HOSPITAL | Age: 82
LOS: 1 days | End: 2022-06-07
Payer: MEDICARE

## 2022-06-07 ENCOUNTER — APPOINTMENT (OUTPATIENT)
Dept: CT IMAGING | Facility: CLINIC | Age: 82
End: 2022-06-07
Payer: MEDICARE

## 2022-06-07 DIAGNOSIS — R42 DIZZINESS AND GIDDINESS: ICD-10-CM

## 2022-06-07 PROCEDURE — 70450 CT HEAD/BRAIN W/O DYE: CPT | Mod: 26,MH

## 2022-06-07 PROCEDURE — 70450 CT HEAD/BRAIN W/O DYE: CPT

## 2022-06-08 ENCOUNTER — NON-APPOINTMENT (OUTPATIENT)
Age: 82
End: 2022-06-08

## 2022-06-08 NOTE — PHYSICAL EXAM
[No Acute Distress] : no acute distress [Well Nourished] : well nourished [Well Developed] : well developed [Normal Sclera/Conjunctiva] : normal sclera/conjunctiva [PERRL] : pupils equal round and reactive to light [EOMI] : extraocular movements intact [No Respiratory Distress] : no respiratory distress  [No Accessory Muscle Use] : no accessory muscle use [Clear to Auscultation] : lungs were clear to auscultation bilaterally [Normal] : normal rate, regular rhythm, normal S1 and S2 and no murmur heard [No Carotid Bruits] : no carotid bruits [No Edema] : there was no peripheral edema [Soft] : abdomen soft [Non Tender] : non-tender [Non-distended] : non-distended [Normal Bowel Sounds] : normal bowel sounds [Normal Posterior Cervical Nodes] : no posterior cervical lymphadenopathy [Normal Anterior Cervical Nodes] : no anterior cervical lymphadenopathy [Cranial Nerves Oculomotor (III)] : the extraocular motions were intact [Cranial Nerves Trigeminal (V)] : sensation to the face and masseter strength were intact [Cranial Nerves Facial (VII)] : facial strength was intact bilaterally [Cranial Nerves Vestibulocochlear (VIII)] : hearing was intact [Cranial Nerves Glossopharyngeal (IX)] : there was normal movement of the soft palate and normal gag [Cranial Nerves Accessory (XI - Cranial And Spinal)] : shoulder shrug was intact bilaterally [Cranial Nerves Hypoglossal (XII)] : there was no tongue deviation with protrusion [Normal Affect] : the affect was normal [Normal Insight/Judgement] : insight and judgment were intact [Past-pointing] : there was no past-pointing [Romberg's Sign] : Romberg's sign was negtive [Coordination - Dysmetria Impaired Finger-to-Nose Bilateral] : not present [de-identified] : few beats nystagmus on lateral gaze  [de-identified] : no palpable hematoma/ ecchymosis/ swelling at site of head injury, reports mild tenderness on palpation.

## 2022-06-08 NOTE — ASSESSMENT
[FreeTextEntry1] : acute on chronic vertigo sx x1 week/ hx CVA, R. watkins radiata infarct w/ left hemiparesis, multiple prior strokes\par check CT head for acute changes from previous imaging\par few beats of nystagmus on lateral gaze with o/w stable neuro check/PE\par advised f/u with Dr. Shah neurology for recheck and continued concerns vertigo \par advised PT exercise management vertigo as previously directed\par will manage vertigo sx with Meclizine\par OTC Tylenol as directed prn pain\par advised to notify office if worsening/persistent symptoms or new onset complaints/concerns, in the event of any emergency or life threatening condition, go to the nearest emergency department and then notify office. \par patient verbalized understanding and agrees with plan of care. \par collaborated with Dr. Arroyo for this visit.

## 2022-06-08 NOTE — REVIEW OF SYSTEMS
[Joint Pain] : joint pain [Dizziness] : dizziness [Negative] : Heme/Lymph [Joint Stiffness] : no joint stiffness [Skin Rash] : no skin rash [Headache] : no headache [Fainting] : no fainting [Confusion] : no confusion [Memory Loss] : no memory loss [FreeTextEntry9] : neck pain a/w vertigo  [de-identified] : hemiparesis and ataxic widened gait at baseline s/p CVA

## 2022-06-08 NOTE — HISTORY OF PRESENT ILLNESS
[Patient Declined  Services] : - None: Patient declined  services [FreeTextEntry8] : this is a 81yo Ghanaian speaking female accompanied by daughter, pmhx CVA, R. corona radiata infarct w/ left hemiparesis, multiple prior strokes p/w acute on chronic vertigo intermittent sx x1 week; \par she had sx vertigo today while sitting in shower bathing, she fell from the chair 2/2 closed eyes in attempt to relieve vertigo sx and slipped from the chair, she bump the left side of her forehead on the counter next to the tub and her daughter found her sitting on the shower floor immediately following her yell. patient denies having lost consciousness while showering, currently c/w vertigo, denies HA, nausea/vomit, weakness, family reports patients speech and behavior seems baseline/normal, no slurring audible. \par she does report pain in the back of her head a/w vertigo sx however daughter reports patient has chronically had this pain. \par daughter reports patient had seen ENT for vertigo sx about 3 months ago u/k name, was given meclizine for mgmt which improved sx and completed home exercises as directed. she had not returned to care of ENT as her sx resolved. \par patient was referred to Dr. Shah neurology in 02/20 for intermittent dizziness, had abn. VNG 01/20 with ENT Dr. Dinh at the time- suggestive of central etiology, following MRI brain dx as vestibular dysfunction/ vestibular neuritis and rec'd mgmt vestibular therapy/ PT for recurrence, she had not been back for f/u with neurology. \par o/w in no acute distress, no other major concerns and reports currently feeling well. \par will evaluate and manage as appropriate.  [FreeTextEntry3] : Indian speaking prefers daughter for translation

## 2022-06-08 NOTE — COUNSELING
[Fall prevention counseling provided] : Fall prevention counseling provided [None] : None [Good understanding] : Patient has a good understanding of lifestyle changes and steps needed to achieve self management goal [de-identified] : you are a fall risk, falls can cause serious injury and complications- encourage slow positional changes, do not walk/run unattended, walk with assistive devices as appropriate, do not drive; patient should be driven if needed. notify/return to office if worsening/persistent symptoms or new onset complaints/concerns, in the event of any emergency or life threatening condition, go to the nearest emergency department and then notify office. \par

## 2022-08-01 ENCOUNTER — RX RENEWAL (OUTPATIENT)
Age: 82
End: 2022-08-01

## 2022-08-05 ENCOUNTER — APPOINTMENT (OUTPATIENT)
Dept: FAMILY MEDICINE | Facility: CLINIC | Age: 82
End: 2022-08-05

## 2022-08-12 ENCOUNTER — RX RENEWAL (OUTPATIENT)
Age: 82
End: 2022-08-12

## 2022-09-02 ENCOUNTER — NON-APPOINTMENT (OUTPATIENT)
Age: 82
End: 2022-09-02

## 2022-09-02 ENCOUNTER — APPOINTMENT (OUTPATIENT)
Dept: FAMILY MEDICINE | Facility: CLINIC | Age: 82
End: 2022-09-02

## 2022-09-02 VITALS
DIASTOLIC BLOOD PRESSURE: 80 MMHG | SYSTOLIC BLOOD PRESSURE: 138 MMHG | HEART RATE: 97 BPM | HEIGHT: 62 IN | WEIGHT: 175 LBS | OXYGEN SATURATION: 93 % | BODY MASS INDEX: 32.2 KG/M2 | TEMPERATURE: 97.3 F

## 2022-09-02 DIAGNOSIS — Z00.00 ENCOUNTER FOR GENERAL ADULT MEDICAL EXAMINATION W/OUT ABNORMAL FINDINGS: ICD-10-CM

## 2022-09-02 DIAGNOSIS — Z87.898 PERSONAL HISTORY OF OTHER SPECIFIED CONDITIONS: ICD-10-CM

## 2022-09-02 DIAGNOSIS — Z87.19 PERSONAL HISTORY OF OTHER DISEASES OF THE DIGESTIVE SYSTEM: ICD-10-CM

## 2022-09-02 DIAGNOSIS — Z95.0 PRESENCE OF CARDIAC PACEMAKER: ICD-10-CM

## 2022-09-02 PROCEDURE — 81003 URINALYSIS AUTO W/O SCOPE: CPT | Mod: QW

## 2022-09-02 PROCEDURE — 36415 COLL VENOUS BLD VENIPUNCTURE: CPT

## 2022-09-02 PROCEDURE — G0439: CPT

## 2022-09-02 PROCEDURE — 93000 ELECTROCARDIOGRAM COMPLETE: CPT

## 2022-09-04 ENCOUNTER — NON-APPOINTMENT (OUTPATIENT)
Age: 82
End: 2022-09-04

## 2022-09-13 ENCOUNTER — RESULT REVIEW (OUTPATIENT)
Age: 82
End: 2022-09-13

## 2022-09-13 ENCOUNTER — APPOINTMENT (OUTPATIENT)
Dept: MAMMOGRAPHY | Facility: CLINIC | Age: 82
End: 2022-09-13

## 2022-09-13 ENCOUNTER — OUTPATIENT (OUTPATIENT)
Dept: OUTPATIENT SERVICES | Facility: HOSPITAL | Age: 82
LOS: 1 days | End: 2022-09-13
Payer: MEDICARE

## 2022-09-13 DIAGNOSIS — Z00.00 ENCOUNTER FOR GENERAL ADULT MEDICAL EXAMINATION WITHOUT ABNORMAL FINDINGS: ICD-10-CM

## 2022-09-13 PROCEDURE — 77067 SCR MAMMO BI INCL CAD: CPT

## 2022-09-13 PROCEDURE — 77067 SCR MAMMO BI INCL CAD: CPT | Mod: 26

## 2022-09-13 PROCEDURE — 77063 BREAST TOMOSYNTHESIS BI: CPT | Mod: 26

## 2022-09-13 PROCEDURE — 77063 BREAST TOMOSYNTHESIS BI: CPT

## 2022-09-14 ENCOUNTER — NON-APPOINTMENT (OUTPATIENT)
Age: 82
End: 2022-09-14

## 2022-09-14 LAB
25(OH)D3 SERPL-MCNC: 19.7 NG/ML
ALBUMIN SERPL ELPH-MCNC: 4.6 G/DL
ALP BLD-CCNC: 104 U/L
ALT SERPL-CCNC: 6 U/L
ANION GAP SERPL CALC-SCNC: 16 MMOL/L
APPEARANCE: CLEAR
AST SERPL-CCNC: 12 U/L
BACTERIA: NEGATIVE
BASOPHILS # BLD AUTO: 0.05 K/UL
BASOPHILS NFR BLD AUTO: 0.6 %
BILIRUB SERPL-MCNC: 0.3 MG/DL
BILIRUB UR QL STRIP: NORMAL
BILIRUBIN URINE: NEGATIVE
BLOOD URINE: NEGATIVE
BUN SERPL-MCNC: 8 MG/DL
CALCIUM SERPL-MCNC: 10 MG/DL
CHLORIDE SERPL-SCNC: 102 MMOL/L
CHOLEST SERPL-MCNC: 140 MG/DL
CLARITY UR: CLEAR
CO2 SERPL-SCNC: 24 MMOL/L
COLLECTION METHOD: NORMAL
COLOR: NORMAL
CREAT SERPL-MCNC: 0.68 MG/DL
CREAT SPEC-SCNC: 132 MG/DL
EGFR: 88 ML/MIN/1.73M2
EOSINOPHIL # BLD AUTO: 0.06 K/UL
EOSINOPHIL NFR BLD AUTO: 0.7 %
ESTIMATED AVERAGE GLUCOSE: 160 MG/DL
GLUCOSE QUALITATIVE U: NEGATIVE
GLUCOSE SERPL-MCNC: 121 MG/DL
GLUCOSE UR-MCNC: NORMAL
HBA1C MFR BLD HPLC: 7.2 %
HCG UR QL: 0.2 EU/DL
HCT VFR BLD CALC: 43.7 %
HDLC SERPL-MCNC: 42 MG/DL
HGB BLD-MCNC: 13.1 G/DL
HGB UR QL STRIP.AUTO: NORMAL
HYALINE CASTS: 4 /LPF
IMM GRANULOCYTES NFR BLD AUTO: 0.1 %
KETONES UR-MCNC: NORMAL
KETONES URINE: NEGATIVE
LDLC SERPL CALC-MCNC: 63 MG/DL
LEUKOCYTE ESTERASE UR QL STRIP: NORMAL
LEUKOCYTE ESTERASE URINE: NEGATIVE
LYMPHOCYTES # BLD AUTO: 1.53 K/UL
LYMPHOCYTES NFR BLD AUTO: 17.7 %
MAN DIFF?: NORMAL
MCHC RBC-ENTMCNC: 26.7 PG
MCHC RBC-ENTMCNC: 30 GM/DL
MCV RBC AUTO: 89 FL
MICROALBUMIN 24H UR DL<=1MG/L-MCNC: 2 MG/DL
MICROALBUMIN/CREAT 24H UR-RTO: 15 MG/G
MICROSCOPIC-UA: NORMAL
MONOCYTES # BLD AUTO: 0.68 K/UL
MONOCYTES NFR BLD AUTO: 7.9 %
NEUTROPHILS # BLD AUTO: 6.29 K/UL
NEUTROPHILS NFR BLD AUTO: 73 %
NITRITE UR QL STRIP: NORMAL
NITRITE URINE: NEGATIVE
NONHDLC SERPL-MCNC: 98 MG/DL
PH UR STRIP: 6
PH URINE: 6.5
PLATELET # BLD AUTO: 291 K/UL
POTASSIUM SERPL-SCNC: 4.7 MMOL/L
PROT SERPL-MCNC: 7.6 G/DL
PROT UR STRIP-MCNC: NORMAL
PROTEIN URINE: NORMAL
RBC # BLD: 4.91 M/UL
RBC # FLD: 15.8 %
RED BLOOD CELLS URINE: 3 /HPF
SODIUM SERPL-SCNC: 142 MMOL/L
SP GR UR STRIP: 1.02
SPECIFIC GRAVITY URINE: 1.02
SQUAMOUS EPITHELIAL CELLS: 1 /HPF
T3FREE SERPL-MCNC: 2.4 PG/ML
T4 FREE SERPL-MCNC: 1.6 NG/DL
TRIGL SERPL-MCNC: 174 MG/DL
TSH SERPL-ACNC: 0.48 UIU/ML
UROBILINOGEN URINE: NORMAL
WBC # FLD AUTO: 8.62 K/UL
WHITE BLOOD CELLS URINE: 1 /HPF

## 2022-09-16 RX ORDER — FLASH GLUCOSE SENSOR
KIT MISCELLANEOUS
Qty: 1 | Refills: 0 | Status: ACTIVE | COMMUNITY
Start: 2022-09-16 | End: 1900-01-01

## 2022-09-23 RX ORDER — FLASH GLUCOSE SENSOR
KIT MISCELLANEOUS
Qty: 1 | Refills: 0 | Status: ACTIVE | COMMUNITY
Start: 2022-09-23 | End: 1900-01-01

## 2022-09-24 ENCOUNTER — RX CHANGE (OUTPATIENT)
Age: 82
End: 2022-09-24

## 2022-09-24 RX ORDER — FLASH GLUCOSE SENSOR
KIT MISCELLANEOUS
Qty: 1 | Refills: 0 | Status: ACTIVE | COMMUNITY
Start: 2022-09-24 | End: 1900-01-01

## 2022-10-31 ENCOUNTER — APPOINTMENT (OUTPATIENT)
Dept: NEUROLOGY | Facility: CLINIC | Age: 82
End: 2022-10-31

## 2022-10-31 VITALS
SYSTOLIC BLOOD PRESSURE: 138 MMHG | WEIGHT: 175 LBS | BODY MASS INDEX: 32.2 KG/M2 | TEMPERATURE: 97.8 F | DIASTOLIC BLOOD PRESSURE: 86 MMHG | HEIGHT: 62 IN | HEART RATE: 83 BPM

## 2022-10-31 PROCEDURE — 99214 OFFICE O/P EST MOD 30 MIN: CPT

## 2022-10-31 NOTE — REVIEW OF SYSTEMS
[Arm Weakness] : arm weakness [Leg Weakness] : leg weakness [Poor Coordination] : poor coordination [Dizziness] : dizziness [Earache] : earache [Constipation] : constipation [Joint Pain] : joint pain [Negative] : Heme/Lymph [Vertigo] : vertigo

## 2022-10-31 NOTE — HISTORY OF PRESENT ILLNESS
[FreeTextEntry1] : Patient is here for a follow-up visit today, last telephonic conversation on 5/18/2020 . She is accompanied by her daughter, she reports that around the summer 2022 she started experiencing vertigo with dizziness, symptoms occurred on and off for few months, was prescribed meclizine, helped alleviate dizziness to a large extent, she had CT scan of head done on 6/7/2022, revealed no acute lesions.\par \par Currently patient's daughter reports that her symptoms have improved, occasionally while trying to lie supine when she is about to go to bed she feels a slight swirling sensation, she has no spinning sensation or tinnitus or visual blurring.\par \par Of note, patient had MRI brain in 3/2020, reveals numerous bilateral chronic lacunar infarcts, no evidence of acute stroke. MRA brain was essentially unremarkable. Was advised to continue aspirin and atorvastatin for stroke prophylaxis, she developed bruises, after few weeks I recommended going down on aspirin every other day, I did not hear from the patient since May 2020, apparently she discontinued aspirin.  She has in the interim had a PPM implanted, and has been following up with new cardiologist and GI specialist in [place]. \par \par

## 2022-10-31 NOTE — REASON FOR VISIT
[Follow-Up: _____] : a [unfilled] follow-up visit [FreeTextEntry1] : For dizziness and discuss recent imaging study

## 2022-10-31 NOTE — PHYSICAL EXAM
[General Appearance - Alert] : alert [General Appearance - In No Acute Distress] : in no acute distress [Oriented To Time, Place, And Person] : oriented to person, place, and time [Impaired Insight] : insight and judgment were intact [Affect] : the affect was normal [Person] : oriented to person [Place] : oriented to place [Time] : oriented to time [Concentration Intact] : normal concentrating ability [Naming Objects] : no difficulty naming common objects [Repeating Phrases] : no difficulty repeating a phrase [Fluency] : fluency intact [Comprehension] : comprehension intact [Past History] : adequate knowledge of personal past history [Cranial Nerves Optic (II)] : visual acuity intact bilaterally,  visual fields full to confrontation, pupils equal round and reactive to light [Cranial Nerves Oculomotor (III)] : extraocular motion intact [Cranial Nerves Trigeminal (V)] : facial sensation intact symmetrically [Cranial Nerves Vestibulocochlear (VIII)] : hearing was intact bilaterally [Cranial Nerves Glossopharyngeal (IX)] : tongue and palate midline [Cranial Nerves Accessory (XI - Cranial And Spinal)] : head turning and shoulder shrug symmetric [Cranial Nerves Hypoglossal (XII)] : there was no tongue deviation with protrusion [Flattening Of Nasolabial Fold On The Left] : flattening of the  nasolabial fold [Cranial Nerves Left Facial: House Grade ___(1-6)] : grade I (100%) facial nerve function [No Muscle Atrophy] : normal bulk in all four extremities [Hemiparesis Of Left Side] : hemiparesis was present on the left [Sensation Tactile Decrease] : light touch was intact [Proprioception] : proprioception was intact [Pain / Temp Decrease Distal Extremities (Glove & Stocking)] : diminished stocking/glove distribution [Vibration Decrease Leg / Foot Both Ankles] : decreased at both ankles [Vibration Decrease Leg / Foot Toes Both Feet] : decreased at the toes of both feet  [2+] : Brachioradialis right 2+ [3+] : Brachioradialis left 3+ [1+] : Patella left 1+ [0] : Ankle jerk left 0 [PERRL With Normal Accommodation] : pupils were equal in size, round, reactive to light, with normal accommodation [Extraocular Movements] : extraocular movements were intact [Full Visual Field] : full visual field [Outer Ear] : the ears and nose were normal in appearance [Neck Cervical Mass (___cm)] : no neck mass was observed [Auscultation Breath Sounds / Voice Sounds] : lungs were clear to auscultation bilaterally [Heart Rate And Rhythm] : heart rate was normal and rhythm regular [Heart Sounds] : normal S1 and S2 [Arterial Pulses Carotid] : carotid pulses were normal with no bruits [Edema] : there was no peripheral edema [Involuntary Movements] : no involuntary movements were seen [] : no rash [FreeTextEntry1] : obese [Registration Intact] : recent registration memory intact [Past-pointing] : there was no past-pointing [Tremor] : no tremor present [Plantar Reflex Right Only] : normal on the right [Plantar Reflex Left Only] : normal on the left [FreeTextEntry6] : Motor: Right upper and lower extremity 5/5, left spastic hemiparesis with significant restriction of left upper extremity proximal movements [FreeTextEntry8] : Unstable; Mild circumduction gait

## 2022-10-31 NOTE — DATA REVIEWED
[de-identified] : 3/7/20: MRI brain reveals numerous bilateral chronic lacunar infarcts, no evidence of acute stroke. MRA brain essentially unremarkable\par 2/25/13: MRI brain with/without contrast: acute lacunar infarction in the posterior right corona radiata extending into the right cortico- spinal tract. Scattered punctate foci of hemosiderin in the left cerebellum and bilateral thalami likely due to prior subclinical hypertensive microhemorrhages, old lacunar infarction in the left cerebellum and thalami [de-identified] : 6/7/22: CT scan of head, no acute lesions.\par /28/20: A1c 7.4, B12 283, total cholesterol 196, HDL 46, \par 1/30/20: VNG - suggestive of central pathology

## 2022-10-31 NOTE — DISCUSSION/SUMMARY
[FreeTextEntry1] : 82 -year-old HF with PMH of HLD, HTN, hypothyroidism, CVA in 2013 with residuall left HP, presented with intermittent dizziness, left ear pain, abnormal VNG suggestive of central etiology.\par \par # Dizziness / intermittent - most likely vestibular dysfunction, MRI/MRA brain not c/w VBI\par \par -Restart vestibular therapy\par - Meclizine as needed \par \par # History of right corona radiata infarct with left hemiparesis ; multiple lacunar infarcts noted on MRI brain 3/7/20\par \par # Risk factors: HTN,  DM, HLD, PPM\par \par - Start aspirin 81 mgs, may take every other day or thrice weekly due to GI issues\par - Atorvastatin 10 mg daily\par -Recommended repeating MRI brain to rule out any new infarcts/strokes, patient's daughter declines as patient has a PPM and would not risk it.\par - Diabetic control

## 2022-11-07 ENCOUNTER — RX RENEWAL (OUTPATIENT)
Age: 82
End: 2022-11-07

## 2022-11-18 ENCOUNTER — NON-APPOINTMENT (OUTPATIENT)
Age: 82
End: 2022-11-18

## 2022-11-18 DIAGNOSIS — U07.1 COVID-19: ICD-10-CM

## 2022-11-21 PROBLEM — U07.1 COVID-19: Status: ACTIVE | Noted: 2022-11-21

## 2022-11-21 RX ORDER — ALBUTEROL SULFATE 90 UG/1
108 (90 BASE) INHALANT RESPIRATORY (INHALATION)
Qty: 1 | Refills: 2 | Status: ACTIVE | COMMUNITY
Start: 2022-11-21 | End: 1900-01-01

## 2022-12-09 ENCOUNTER — APPOINTMENT (OUTPATIENT)
Dept: FAMILY MEDICINE | Facility: CLINIC | Age: 82
End: 2022-12-09

## 2022-12-09 VITALS
HEART RATE: 75 BPM | HEIGHT: 62 IN | BODY MASS INDEX: 32.2 KG/M2 | TEMPERATURE: 98.4 F | OXYGEN SATURATION: 95 % | WEIGHT: 175 LBS | DIASTOLIC BLOOD PRESSURE: 60 MMHG | SYSTOLIC BLOOD PRESSURE: 142 MMHG

## 2022-12-09 LAB
BILIRUB UR QL STRIP: NORMAL
CLARITY UR: CLEAR
COLLECTION METHOD: NORMAL
GLUCOSE UR-MCNC: NORMAL
HCG UR QL: 0.2 EU/DL
HGB UR QL STRIP.AUTO: NORMAL
KETONES UR-MCNC: NORMAL
LEUKOCYTE ESTERASE UR QL STRIP: NORMAL
NITRITE UR QL STRIP: NORMAL
PH UR STRIP: 7
PROT UR STRIP-MCNC: NORMAL
SP GR UR STRIP: 1.01

## 2022-12-09 PROCEDURE — 99213 OFFICE O/P EST LOW 20 MIN: CPT | Mod: 25

## 2022-12-09 PROCEDURE — 81003 URINALYSIS AUTO W/O SCOPE: CPT | Mod: QW

## 2022-12-09 PROCEDURE — 36415 COLL VENOUS BLD VENIPUNCTURE: CPT

## 2022-12-12 LAB
ALBUMIN SERPL ELPH-MCNC: 4.5 G/DL
ALP BLD-CCNC: 91 U/L
ALT SERPL-CCNC: 8 U/L
ANION GAP SERPL CALC-SCNC: 10 MMOL/L
APPEARANCE: CLEAR
AST SERPL-CCNC: 11 U/L
BACTERIA: NEGATIVE
BILIRUB SERPL-MCNC: 0.2 MG/DL
BILIRUBIN URINE: NEGATIVE
BLOOD URINE: NEGATIVE
BUN SERPL-MCNC: 8 MG/DL
CALCIUM SERPL-MCNC: 10 MG/DL
CHLORIDE SERPL-SCNC: 100 MMOL/L
CO2 SERPL-SCNC: 28 MMOL/L
COLOR: NORMAL
CREAT SERPL-MCNC: 0.63 MG/DL
CREAT SPEC-SCNC: 79 MG/DL
EGFR: 90 ML/MIN/1.73M2
ESTIMATED AVERAGE GLUCOSE: 151 MG/DL
GLUCOSE QUALITATIVE U: NEGATIVE
GLUCOSE SERPL-MCNC: 142 MG/DL
HBA1C MFR BLD HPLC: 6.9 %
HYALINE CASTS: 0 /LPF
KETONES URINE: NEGATIVE
LEUKOCYTE ESTERASE URINE: ABNORMAL
MICROALBUMIN 24H UR DL<=1MG/L-MCNC: <1.2 MG/DL
MICROALBUMIN/CREAT 24H UR-RTO: NORMAL MG/G
MICROSCOPIC-UA: NORMAL
NITRITE URINE: NEGATIVE
PH URINE: 7
POTASSIUM SERPL-SCNC: 4.7 MMOL/L
PROT SERPL-MCNC: 7.2 G/DL
PROTEIN URINE: NORMAL
RED BLOOD CELLS URINE: 1 /HPF
SODIUM SERPL-SCNC: 139 MMOL/L
SPECIFIC GRAVITY URINE: 1.01
SQUAMOUS EPITHELIAL CELLS: 6 /HPF
UROBILINOGEN URINE: NORMAL
WHITE BLOOD CELLS URINE: 7 /HPF

## 2023-02-07 ENCOUNTER — RX RENEWAL (OUTPATIENT)
Age: 83
End: 2023-02-07

## 2023-03-02 ENCOUNTER — RX RENEWAL (OUTPATIENT)
Age: 83
End: 2023-03-02

## 2023-03-03 ENCOUNTER — RX RENEWAL (OUTPATIENT)
Age: 83
End: 2023-03-03

## 2023-03-10 ENCOUNTER — APPOINTMENT (OUTPATIENT)
Dept: FAMILY MEDICINE | Facility: CLINIC | Age: 83
End: 2023-03-10
Payer: MEDICARE

## 2023-03-10 ENCOUNTER — RESULT CHARGE (OUTPATIENT)
Age: 83
End: 2023-03-10

## 2023-03-10 VITALS
TEMPERATURE: 99 F | OXYGEN SATURATION: 95 % | DIASTOLIC BLOOD PRESSURE: 84 MMHG | WEIGHT: 175 LBS | BODY MASS INDEX: 32.2 KG/M2 | HEART RATE: 81 BPM | HEIGHT: 62 IN | SYSTOLIC BLOOD PRESSURE: 146 MMHG

## 2023-03-10 LAB
BILIRUB UR QL STRIP: NORMAL
CLARITY UR: CLEAR
COLLECTION METHOD: NORMAL
GLUCOSE UR-MCNC: NORMAL
HCG UR QL: 0.2 EU/DL
HGB UR QL STRIP.AUTO: NORMAL
KETONES UR-MCNC: NORMAL
LEUKOCYTE ESTERASE UR QL STRIP: ABNORMAL
NITRITE UR QL STRIP: NORMAL
PH UR STRIP: 7
PROT UR STRIP-MCNC: NORMAL
SP GR UR STRIP: 1.01

## 2023-03-10 PROCEDURE — 99213 OFFICE O/P EST LOW 20 MIN: CPT

## 2023-03-13 LAB
ALBUMIN SERPL ELPH-MCNC: 4.4 G/DL
ALP BLD-CCNC: 107 U/L
ALT SERPL-CCNC: 7 U/L
ANION GAP SERPL CALC-SCNC: 14 MMOL/L
APPEARANCE: CLEAR
AST SERPL-CCNC: 15 U/L
BACTERIA: NEGATIVE
BILIRUB SERPL-MCNC: 0.2 MG/DL
BILIRUBIN URINE: NEGATIVE
BLOOD URINE: NEGATIVE
BUN SERPL-MCNC: 8 MG/DL
CALCIUM SERPL-MCNC: 9.8 MG/DL
CHLORIDE SERPL-SCNC: 100 MMOL/L
CHOLEST SERPL-MCNC: 133 MG/DL
CO2 SERPL-SCNC: 26 MMOL/L
COLOR: NORMAL
CREAT SERPL-MCNC: 0.65 MG/DL
CREAT SPEC-SCNC: 60 MG/DL
EGFR: 88 ML/MIN/1.73M2
ESTIMATED AVERAGE GLUCOSE: 160 MG/DL
GLUCOSE QUALITATIVE U: NEGATIVE
GLUCOSE SERPL-MCNC: 105 MG/DL
HBA1C MFR BLD HPLC: 7.2 %
HDLC SERPL-MCNC: 42 MG/DL
HYALINE CASTS: 2 /LPF
KETONES URINE: NEGATIVE
LDLC SERPL CALC-MCNC: 66 MG/DL
LEUKOCYTE ESTERASE URINE: ABNORMAL
MICROALBUMIN 24H UR DL<=1MG/L-MCNC: <1.2 MG/DL
MICROALBUMIN/CREAT 24H UR-RTO: NORMAL MG/G
MICROSCOPIC-UA: NORMAL
NITRITE URINE: NEGATIVE
NONHDLC SERPL-MCNC: 91 MG/DL
PH URINE: 7.5
POTASSIUM SERPL-SCNC: 4.6 MMOL/L
PROT SERPL-MCNC: 7.1 G/DL
PROTEIN URINE: NEGATIVE
RED BLOOD CELLS URINE: 1 /HPF
SODIUM SERPL-SCNC: 140 MMOL/L
SPECIFIC GRAVITY URINE: 1.01
SQUAMOUS EPITHELIAL CELLS: 1 /HPF
TRIGL SERPL-MCNC: 129 MG/DL
UROBILINOGEN URINE: NORMAL
WHITE BLOOD CELLS URINE: 2 /HPF

## 2023-03-16 ENCOUNTER — RX RENEWAL (OUTPATIENT)
Age: 83
End: 2023-03-16

## 2023-05-05 ENCOUNTER — RX RENEWAL (OUTPATIENT)
Age: 83
End: 2023-05-05

## 2023-06-16 ENCOUNTER — APPOINTMENT (OUTPATIENT)
Dept: FAMILY MEDICINE | Facility: CLINIC | Age: 83
End: 2023-06-16
Payer: MEDICARE

## 2023-06-16 VITALS
HEART RATE: 71 BPM | OXYGEN SATURATION: 96 % | BODY MASS INDEX: 32.2 KG/M2 | SYSTOLIC BLOOD PRESSURE: 150 MMHG | WEIGHT: 175 LBS | HEIGHT: 62 IN | TEMPERATURE: 98.5 F | DIASTOLIC BLOOD PRESSURE: 70 MMHG

## 2023-06-16 PROCEDURE — 81003 URINALYSIS AUTO W/O SCOPE: CPT | Mod: QW

## 2023-06-16 PROCEDURE — 99214 OFFICE O/P EST MOD 30 MIN: CPT | Mod: 25

## 2023-06-16 PROCEDURE — 36415 COLL VENOUS BLD VENIPUNCTURE: CPT

## 2023-06-17 LAB
ALBUMIN SERPL ELPH-MCNC: 4.4 G/DL
ALP BLD-CCNC: 98 U/L
ALT SERPL-CCNC: 11 U/L
ANION GAP SERPL CALC-SCNC: 14 MMOL/L
AST SERPL-CCNC: 20 U/L
BILIRUB SERPL-MCNC: 0.3 MG/DL
BILIRUB UR QL STRIP: NORMAL
BUN SERPL-MCNC: 10 MG/DL
CALCIUM SERPL-MCNC: 9.9 MG/DL
CHLORIDE SERPL-SCNC: 103 MMOL/L
CHOLEST SERPL-MCNC: 140 MG/DL
CLARITY UR: CLEAR
CO2 SERPL-SCNC: 23 MMOL/L
COLLECTION METHOD: NORMAL
CREAT SERPL-MCNC: 0.67 MG/DL
CREAT SPEC-SCNC: 27 MG/DL
EGFR: 88 ML/MIN/1.73M2
ESTIMATED AVERAGE GLUCOSE: 154 MG/DL
GLUCOSE SERPL-MCNC: 100 MG/DL
GLUCOSE UR-MCNC: NORMAL
HBA1C MFR BLD HPLC: 7 %
HCG UR QL: 0.2 EU/DL
HDLC SERPL-MCNC: 45 MG/DL
HGB UR QL STRIP.AUTO: NORMAL
KETONES UR-MCNC: NORMAL
LDLC SERPL CALC-MCNC: 66 MG/DL
LEUKOCYTE ESTERASE UR QL STRIP: ABNORMAL
MICROALBUMIN 24H UR DL<=1MG/L-MCNC: 1.9 MG/DL
MICROALBUMIN/CREAT 24H UR-RTO: 68 MG/G
NITRITE UR QL STRIP: NORMAL
NONHDLC SERPL-MCNC: 96 MG/DL
PH UR STRIP: 6.5
POTASSIUM SERPL-SCNC: 5.1 MMOL/L
PROT SERPL-MCNC: 7.3 G/DL
PROT UR STRIP-MCNC: NORMAL
SODIUM SERPL-SCNC: 140 MMOL/L
SP GR UR STRIP: 1.01
T3FREE SERPL-MCNC: 2.57 PG/ML
T4 FREE SERPL-MCNC: 1.6 NG/DL
TRIGL SERPL-MCNC: 150 MG/DL
TSH SERPL-ACNC: 0.59 UIU/ML

## 2023-06-19 ENCOUNTER — RX RENEWAL (OUTPATIENT)
Age: 83
End: 2023-06-19

## 2023-08-07 ENCOUNTER — APPOINTMENT (OUTPATIENT)
Dept: UROLOGY | Facility: CLINIC | Age: 83
End: 2023-08-07
Payer: MEDICARE

## 2023-08-07 VITALS
SYSTOLIC BLOOD PRESSURE: 165 MMHG | HEART RATE: 76 BPM | HEIGHT: 61 IN | WEIGHT: 175 LBS | RESPIRATION RATE: 16 BRPM | DIASTOLIC BLOOD PRESSURE: 86 MMHG | OXYGEN SATURATION: 98 % | BODY MASS INDEX: 33.04 KG/M2

## 2023-08-07 PROCEDURE — 99214 OFFICE O/P EST MOD 30 MIN: CPT

## 2023-08-07 PROCEDURE — 51798 US URINE CAPACITY MEASURE: CPT

## 2023-08-07 PROCEDURE — 81003 URINALYSIS AUTO W/O SCOPE: CPT | Mod: QW

## 2023-08-07 NOTE — REVIEW OF SYSTEMS
[Dry Eyes] : dryness of the eyes [Palpitations] : palpitations [Wheezing] : wheezing [Cough] : cough [Diarrhea] : diarrhea [Urine retention] : urine retention [Wake up at night to urinate  How many times?  ___] : wakes up to urinate [unfilled] times during the night [Strain or push to urinate] : strain or push to urinate [Joint Pain] : joint pain [Joint Swelling] : joint swelling [Difficulty Walking] : difficulty walking [Negative] : Heme/Lymph

## 2023-08-09 LAB
BILIRUB UR QL STRIP: NEGATIVE
CLARITY UR: CLEAR
COLLECTION METHOD: NORMAL
GLUCOSE UR-MCNC: NEGATIVE
HCG UR QL: 0.2 EU/DL
HGB UR QL STRIP.AUTO: NEGATIVE
KETONES UR-MCNC: NEGATIVE
LEUKOCYTE ESTERASE UR QL STRIP: NORMAL
NITRITE UR QL STRIP: NEGATIVE
PH UR STRIP: 5.5
PROT UR STRIP-MCNC: NEGATIVE
SP GR UR STRIP: 1

## 2023-08-09 NOTE — HISTORY OF PRESENT ILLNESS
[FreeTextEntry1] : HETAL CATHERINE  83 year F presents for  pelvic pain / suprapubic after voids for the past 2 months. Bothersome Urgency incontinence able to stop, Does not wear pads. And this has been going on x 6 months.  Coffee x 1, Occasional chili Drink 4 bottles   Csec x 3 Hysterectomy for fibroids  Denies heamturia. Accompanied by daughter who is helping with history as mom forgets.

## 2023-08-09 NOTE — PHYSICAL EXAM
[No Focal Deficits] : no focal deficits [Oriented To Time, Place, And Person] : oriented to person, place, and time [Affect] : the affect was normal [Mood] : the mood was normal [Not Anxious] : not anxious [No Palpable Adenopathy] : no palpable adenopathy [General Appearance - Well Developed] : well developed [General Appearance - Well Nourished] : well nourished [Normal Appearance] : normal appearance [Well Groomed] : well groomed [General Appearance - In No Acute Distress] : no acute distress [Edema] : no peripheral edema [Respiration, Rhythm And Depth] : normal respiratory rhythm and effort [Exaggerated Use Of Accessory Muscles For Inspiration] : no accessory muscle use [Abdomen Soft] : soft [Abdomen Tenderness] : non-tender [Costovertebral Angle Tenderness] : no ~M costovertebral angle tenderness [Urinary Bladder Findings] : the bladder was normal on palpation [Normal Station and Gait] : the gait and station were normal for the patient's age [] : no rash [FreeTextEntry1] : hypertonus, no prolapse

## 2023-08-09 NOTE — ASSESSMENT
[FreeTextEntry1] : 82 y/o with hypertonus and urgency incontinence, pelvic pain. Agrees to Flexeril 5 mg qhs. Can cut in half if senses medication is causing drowsiness.   post void residual and urine dip discussed with patient.   alkaline water, Avoid bladder irritants. List given.  Natures, Way kidney bladder Warm baths or compresses follow up 8 weeks

## 2023-09-11 ENCOUNTER — APPOINTMENT (OUTPATIENT)
Dept: NEUROLOGY | Facility: CLINIC | Age: 83
End: 2023-09-11
Payer: MEDICARE

## 2023-09-11 VITALS
HEART RATE: 79 BPM | WEIGHT: 176 LBS | TEMPERATURE: 97.8 F | SYSTOLIC BLOOD PRESSURE: 133 MMHG | BODY MASS INDEX: 33.23 KG/M2 | HEIGHT: 61 IN | DIASTOLIC BLOOD PRESSURE: 81 MMHG

## 2023-09-11 DIAGNOSIS — R26.89 OTHER ABNORMALITIES OF GAIT AND MOBILITY: ICD-10-CM

## 2023-09-11 DIAGNOSIS — R42 DIZZINESS AND GIDDINESS: ICD-10-CM

## 2023-09-11 DIAGNOSIS — G81.94 HEMIPLEGIA, UNSPECIFIED AFFECTING LEFT NONDOMINANT SIDE: ICD-10-CM

## 2023-09-11 PROCEDURE — 99214 OFFICE O/P EST MOD 30 MIN: CPT

## 2023-09-22 ENCOUNTER — RX RENEWAL (OUTPATIENT)
Age: 83
End: 2023-09-22

## 2023-09-30 ENCOUNTER — APPOINTMENT (OUTPATIENT)
Dept: FAMILY MEDICINE | Facility: CLINIC | Age: 83
End: 2023-09-30
Payer: MEDICARE

## 2023-09-30 VITALS
SYSTOLIC BLOOD PRESSURE: 160 MMHG | TEMPERATURE: 97.8 F | HEART RATE: 79 BPM | OXYGEN SATURATION: 98 % | DIASTOLIC BLOOD PRESSURE: 82 MMHG

## 2023-09-30 VITALS — BODY MASS INDEX: 33.42 KG/M2 | WEIGHT: 177 LBS | HEIGHT: 61 IN

## 2023-09-30 DIAGNOSIS — Z76.89 PERSONS ENCOUNTERING HEALTH SERVICES IN OTHER SPECIFIED CIRCUMSTANCES: ICD-10-CM

## 2023-09-30 PROCEDURE — G0008: CPT

## 2023-09-30 PROCEDURE — 90662 IIV NO PRSV INCREASED AG IM: CPT

## 2023-09-30 PROCEDURE — 81003 URINALYSIS AUTO W/O SCOPE: CPT | Mod: QW

## 2023-09-30 PROCEDURE — G0439: CPT

## 2023-09-30 PROCEDURE — 99173 VISUAL ACUITY SCREEN: CPT

## 2023-10-02 ENCOUNTER — APPOINTMENT (OUTPATIENT)
Dept: UROLOGY | Facility: CLINIC | Age: 83
End: 2023-10-02
Payer: MEDICARE

## 2023-10-02 VITALS
HEART RATE: 85 BPM | WEIGHT: 177 LBS | BODY MASS INDEX: 33.42 KG/M2 | DIASTOLIC BLOOD PRESSURE: 85 MMHG | SYSTOLIC BLOOD PRESSURE: 149 MMHG | OXYGEN SATURATION: 98 % | HEIGHT: 61 IN

## 2023-10-02 DIAGNOSIS — Z86.39 PERSONAL HISTORY OF OTHER ENDOCRINE, NUTRITIONAL AND METABOLIC DISEASE: ICD-10-CM

## 2023-10-02 PROCEDURE — 99212 OFFICE O/P EST SF 10 MIN: CPT

## 2023-10-16 LAB
25(OH)D3 SERPL-MCNC: 13.1 NG/ML
ALBUMIN SERPL ELPH-MCNC: 4.4 G/DL
ALP BLD-CCNC: 97 U/L
ALT SERPL-CCNC: 6 U/L
ANION GAP SERPL CALC-SCNC: 12 MMOL/L
APPEARANCE: CLEAR
AST SERPL-CCNC: 14 U/L
BACTERIA: ABNORMAL /HPF
BILIRUB SERPL-MCNC: 0.3 MG/DL
BILIRUB UR QL STRIP: NEGATIVE
BILIRUBIN URINE: NEGATIVE
BLOOD URINE: NEGATIVE
BUN SERPL-MCNC: 6 MG/DL
CALCIUM SERPL-MCNC: 9.8 MG/DL
CAST: 0 /LPF
CHLORIDE SERPL-SCNC: 100 MMOL/L
CHOLEST SERPL-MCNC: 125 MG/DL
CLARITY UR: CLEAR
CO2 SERPL-SCNC: 27 MMOL/L
COLLECTION METHOD: NORMAL
COLOR: YELLOW
CREAT SERPL-MCNC: 0.61 MG/DL
EGFR: 90 ML/MIN/1.73M2
EPITHELIAL CELLS: 6 /HPF
ESTIMATED AVERAGE GLUCOSE: 157 MG/DL
FOLATE SERPL-MCNC: 13 NG/ML
GLUCOSE QUALITATIVE U: NEGATIVE MG/DL
GLUCOSE SERPL-MCNC: 127 MG/DL
GLUCOSE UR-MCNC: NEGATIVE
HBA1C MFR BLD HPLC: 7.1 %
HCG UR QL: 0.2 EU/DL
HCT VFR BLD CALC: 40.9 %
HDLC SERPL-MCNC: 43 MG/DL
HGB BLD-MCNC: 12.7 G/DL
HGB UR QL STRIP.AUTO: NEGATIVE
KETONES UR-MCNC: NEGATIVE
KETONES URINE: NEGATIVE MG/DL
LDLC SERPL CALC-MCNC: 60 MG/DL
LEUKOCYTE ESTERASE UR QL STRIP: NORMAL
LEUKOCYTE ESTERASE URINE: ABNORMAL
MCHC RBC-ENTMCNC: 26.7 PG
MCHC RBC-ENTMCNC: 31.1 GM/DL
MCV RBC AUTO: 85.9 FL
MICROSCOPIC-UA: NORMAL
NITRITE UR QL STRIP: NEGATIVE
NITRITE URINE: NEGATIVE
NONHDLC SERPL-MCNC: 82 MG/DL
PH UR STRIP: 7
PH URINE: 7.5
PLATELET # BLD AUTO: 309 K/UL
POTASSIUM SERPL-SCNC: 4.6 MMOL/L
PROT SERPL-MCNC: 7.3 G/DL
PROT UR STRIP-MCNC: NEGATIVE
PROTEIN URINE: NEGATIVE MG/DL
RBC # BLD: 4.76 M/UL
RBC # FLD: 15.3 %
RED BLOOD CELLS URINE: 0 /HPF
REVIEW: NORMAL
SODIUM SERPL-SCNC: 139 MMOL/L
SP GR UR STRIP: 1.01
SPECIFIC GRAVITY URINE: 1.01
T3FREE SERPL-MCNC: 2.63 PG/ML
T4 FREE SERPL-MCNC: 1.5 NG/DL
TRIGL SERPL-MCNC: 126 MG/DL
TSH SERPL-ACNC: 1.2 UIU/ML
UROBILINOGEN URINE: 0.2 MG/DL
VIT B12 SERPL-MCNC: 376 PG/ML
WBC # FLD AUTO: 6.79 K/UL
WHITE BLOOD CELLS URINE: 3 /HPF

## 2023-10-30 ENCOUNTER — RX RENEWAL (OUTPATIENT)
Age: 83
End: 2023-10-30

## 2023-11-01 ENCOUNTER — APPOINTMENT (OUTPATIENT)
Dept: GASTROENTEROLOGY | Facility: CLINIC | Age: 83
End: 2023-11-01

## 2023-11-02 NOTE — PLAN
[FreeTextEntry1] : Blood work done in office today. Will follow up on results with patient.\par Diabetic education and counseling provided. Eucrisa Counseling: Patient may experience a mild burning sensation during topical application. Eucrisa is not approved in children less than 3 months of age.

## 2024-01-06 ENCOUNTER — APPOINTMENT (OUTPATIENT)
Dept: FAMILY MEDICINE | Facility: CLINIC | Age: 84
End: 2024-01-06
Payer: MEDICARE

## 2024-01-06 VITALS
TEMPERATURE: 98.1 F | OXYGEN SATURATION: 98 % | WEIGHT: 177 LBS | DIASTOLIC BLOOD PRESSURE: 60 MMHG | HEIGHT: 61 IN | SYSTOLIC BLOOD PRESSURE: 130 MMHG | BODY MASS INDEX: 33.42 KG/M2 | HEART RATE: 78 BPM

## 2024-01-06 DIAGNOSIS — E03.9 HYPOTHYROIDISM, UNSPECIFIED: ICD-10-CM

## 2024-01-06 DIAGNOSIS — I10 ESSENTIAL (PRIMARY) HYPERTENSION: ICD-10-CM

## 2024-01-06 PROCEDURE — 99214 OFFICE O/P EST MOD 30 MIN: CPT | Mod: 25

## 2024-01-06 PROCEDURE — 36415 COLL VENOUS BLD VENIPUNCTURE: CPT

## 2024-01-06 PROCEDURE — 81003 URINALYSIS AUTO W/O SCOPE: CPT | Mod: QW

## 2024-01-06 RX ORDER — CYCLOBENZAPRINE HYDROCHLORIDE 5 MG/1
5 TABLET, FILM COATED ORAL
Qty: 30 | Refills: 2 | Status: DISCONTINUED | COMMUNITY
Start: 2023-08-07 | End: 2024-01-06

## 2024-01-06 RX ORDER — PANTOPRAZOLE SODIUM 40 MG/1
40 TABLET, DELAYED RELEASE ORAL
Refills: 0 | Status: DISCONTINUED | COMMUNITY
End: 2024-01-06

## 2024-01-06 RX ORDER — ENALAPRIL MALEATE 5 MG/1
5 TABLET ORAL TWICE DAILY
Qty: 180 | Refills: 0 | Status: DISCONTINUED | COMMUNITY
Start: 2023-09-22 | End: 2024-01-06

## 2024-01-06 RX ORDER — MECLIZINE HYDROCHLORIDE 12.5 MG/1
12.5 TABLET ORAL
Qty: 20 | Refills: 0 | Status: DISCONTINUED | COMMUNITY
Start: 2022-06-03 | End: 2024-01-06

## 2024-01-06 NOTE — HISTORY OF PRESENT ILLNESS
[Diabetes Mellitus] : Diabetes Mellitus [Hyperlipidemia] : Hyperlipidemia [Hypertension] : Hypertension

## 2024-01-06 NOTE — PLAN
[FreeTextEntry1] : Blood work done in office today. Will follow up on results with patient. appropriate counseling provided

## 2024-01-08 LAB
25(OH)D3 SERPL-MCNC: 23.7 NG/ML
ALBUMIN SERPL ELPH-MCNC: 4.5 G/DL
ALP BLD-CCNC: 103 U/L
ALT SERPL-CCNC: 9 U/L
ANION GAP SERPL CALC-SCNC: 11 MMOL/L
APPEARANCE: CLEAR
AST SERPL-CCNC: 15 U/L
BACTERIA: ABNORMAL /HPF
BILIRUB SERPL-MCNC: 0.3 MG/DL
BILIRUBIN URINE: NEGATIVE
BLOOD URINE: NEGATIVE
BUN SERPL-MCNC: 8 MG/DL
CALCIUM SERPL-MCNC: 9.6 MG/DL
CAST: 0 /LPF
CHLORIDE SERPL-SCNC: 102 MMOL/L
CHOLEST SERPL-MCNC: 125 MG/DL
CO2 SERPL-SCNC: 26 MMOL/L
COLOR: YELLOW
CREAT SERPL-MCNC: 0.63 MG/DL
CREAT SPEC-SCNC: 52 MG/DL
EGFR: 89 ML/MIN/1.73M2
EPITHELIAL CELLS: 5 /HPF
ESTIMATED AVERAGE GLUCOSE: 160 MG/DL
GLUCOSE QUALITATIVE U: NEGATIVE MG/DL
GLUCOSE SERPL-MCNC: 121 MG/DL
HBA1C MFR BLD HPLC: 7.2 %
HCT VFR BLD CALC: 41.2 %
HDLC SERPL-MCNC: 45 MG/DL
HGB BLD-MCNC: 12.6 G/DL
KETONES URINE: NEGATIVE MG/DL
LDLC SERPL CALC-MCNC: 58 MG/DL
LEUKOCYTE ESTERASE URINE: ABNORMAL
MCHC RBC-ENTMCNC: 25.9 PG
MCHC RBC-ENTMCNC: 30.6 GM/DL
MCV RBC AUTO: 84.6 FL
MICROALBUMIN 24H UR DL<=1MG/L-MCNC: 1.3 MG/DL
MICROALBUMIN/CREAT 24H UR-RTO: 24 MG/G
MICROSCOPIC-UA: NORMAL
NITRITE URINE: NEGATIVE
NONHDLC SERPL-MCNC: 81 MG/DL
PH URINE: 6.5
PLATELET # BLD AUTO: 306 K/UL
POTASSIUM SERPL-SCNC: 4.7 MMOL/L
PROT SERPL-MCNC: 7.3 G/DL
PROTEIN URINE: NEGATIVE MG/DL
RBC # BLD: 4.87 M/UL
RBC # FLD: 15.6 %
RED BLOOD CELLS URINE: 0 /HPF
SODIUM SERPL-SCNC: 138 MMOL/L
SPECIFIC GRAVITY URINE: 1.01
T3FREE SERPL-MCNC: 2.67 PG/ML
T4 FREE SERPL-MCNC: 1.6 NG/DL
TRIGL SERPL-MCNC: 129 MG/DL
TSH SERPL-ACNC: 0.7 UIU/ML
UROBILINOGEN URINE: 0.2 MG/DL
VIT B12 SERPL-MCNC: 347 PG/ML
WBC # FLD AUTO: 7.01 K/UL
WHITE BLOOD CELLS URINE: 2 /HPF

## 2024-02-02 ENCOUNTER — OFFICE (OUTPATIENT)
Dept: URBAN - METROPOLITAN AREA CLINIC 102 | Facility: CLINIC | Age: 84
Setting detail: OPHTHALMOLOGY
End: 2024-02-02
Payer: MEDICARE

## 2024-02-02 DIAGNOSIS — H35.3121: ICD-10-CM

## 2024-02-02 DIAGNOSIS — H35.3112: ICD-10-CM

## 2024-02-02 DIAGNOSIS — H47.391: ICD-10-CM

## 2024-02-02 DIAGNOSIS — H35.363: ICD-10-CM

## 2024-02-02 PROBLEM — H16.223 DRY EYE SYNDROME K SICCA; BOTH EYES: Status: ACTIVE | Noted: 2024-02-02

## 2024-02-02 PROCEDURE — 92014 COMPRE OPH EXAM EST PT 1/>: CPT | Performed by: OPHTHALMOLOGY

## 2024-02-02 PROCEDURE — 92250 FUNDUS PHOTOGRAPHY W/I&R: CPT | Performed by: OPHTHALMOLOGY

## 2024-02-02 ASSESSMENT — REFRACTION_AUTOREFRACTION
OS_CYLINDER: -3.00
OD_AXIS: 086
OD_CYLINDER: -2.50
OS_AXIS: 089
OD_SPHERE: +3.25
OS_SPHERE: +3.75

## 2024-02-02 ASSESSMENT — REFRACTION_CURRENTRX
OD_OVR_VA: 20/
OD_CYLINDER: -1.00
OS_ADD: +3.25
OD_AXIS: 76
OS_AXIS: 90
OD_SPHERE: +1.75
OS_SPHERE: +2.00
OD_ADD: +3.25
OS_CYLINDER: -1.50
OS_VPRISM_DIRECTION: BF
OS_OVR_VA: 20/
OD_VPRISM_DIRECTION: BF

## 2024-02-02 ASSESSMENT — SPHEQUIV_DERIVED
OS_SPHEQUIV: 1.75
OD_SPHEQUIV: 1.25
OS_SPHEQUIV: 2.25
OD_SPHEQUIV: 2

## 2024-02-02 ASSESSMENT — REFRACTION_MANIFEST
OD_SPHERE: +2.25
OS_SPHERE: +3.00
OS_VA1: 20/30+
OS_ADD: +2.50
OD_CYLINDER: -2.00
OD_ADD: +2.50
OS_AXIS: 902
OD_VA1: 20/30-
OS_CYLINDER: -2.50
OD_AXIS: 90

## 2024-02-02 ASSESSMENT — CONFRONTATIONAL VISUAL FIELD TEST (CVF)
OS_FINDINGS: FULL
OD_FINDINGS: FULL

## 2024-03-25 ENCOUNTER — APPOINTMENT (OUTPATIENT)
Dept: NEUROLOGY | Facility: CLINIC | Age: 84
End: 2024-03-25
Payer: MEDICARE

## 2024-03-25 VITALS
SYSTOLIC BLOOD PRESSURE: 145 MMHG | BODY MASS INDEX: 33.99 KG/M2 | TEMPERATURE: 97.8 F | HEART RATE: 85 BPM | WEIGHT: 180 LBS | HEIGHT: 61 IN | DIASTOLIC BLOOD PRESSURE: 81 MMHG

## 2024-03-25 DIAGNOSIS — Z86.73 PERSONAL HISTORY OF TRANSIENT ISCHEMIC ATTACK (TIA), AND CEREBRAL INFARCTION W/OUT RESIDUAL DEFICITS: ICD-10-CM

## 2024-03-25 DIAGNOSIS — E53.8 DEFICIENCY OF OTHER SPECIFIED B GROUP VITAMINS: ICD-10-CM

## 2024-03-25 DIAGNOSIS — R41.3 OTHER AMNESIA: ICD-10-CM

## 2024-03-25 PROCEDURE — 99214 OFFICE O/P EST MOD 30 MIN: CPT

## 2024-03-25 RX ORDER — MAGNESIUM 200 MG
1000 TABLET ORAL
Qty: 90 | Refills: 0 | Status: ACTIVE | COMMUNITY
Start: 2024-03-25 | End: 1900-01-01

## 2024-03-25 NOTE — HISTORY OF PRESENT ILLNESS
[FreeTextEntry1] : Patient is here for a follow-up visit today, last seen 9/11/23, is accompanied by her daughter.  Patient's daughter reports that patient has severe knee pains, she has recently seeing a pain management physician, has been scheduled for an injection, patient is now agreeable to giving it a try, previously she had always declined as she is afraid of needles.  Patient has noted total resolution of vertigo and dizziness.  Patient continues to have short-term memory issues, not significantly changed.  .  Patient's daughter also reports that she had blood work with PMD, A1c was 7.2,  B12 347, TSH level 0.70

## 2024-03-25 NOTE — PHYSICAL EXAM
[General Appearance - Alert] : alert [General Appearance - In No Acute Distress] : in no acute distress [Oriented To Time, Place, And Person] : oriented to person, place, and time [Impaired Insight] : insight and judgment were intact [Affect] : the affect was normal [Person] : oriented to person [Place] : oriented to place [Time] : oriented to time [Registration Intact] : recent registration memory intact [Concentration Intact] : normal concentrating ability [Naming Objects] : no difficulty naming common objects [Repeating Phrases] : no difficulty repeating a phrase [Fluency] : fluency intact [Cranial Nerves Optic (II)] : visual acuity intact bilaterally,  visual fields full to confrontation, pupils equal round and reactive to light [Comprehension] : comprehension intact [Cranial Nerves Trigeminal (V)] : facial sensation intact symmetrically [Cranial Nerves Oculomotor (III)] : extraocular motion intact [Cranial Nerves Vestibulocochlear (VIII)] : hearing was intact bilaterally [Cranial Nerves Accessory (XI - Cranial And Spinal)] : head turning and shoulder shrug symmetric [Cranial Nerves Glossopharyngeal (IX)] : tongue and palate midline [Cranial Nerves Hypoglossal (XII)] : there was no tongue deviation with protrusion [Cranial Nerves Left Facial: House Grade ___(1-6)] : grade I (100%) facial nerve function [Flattening Of Nasolabial Fold On The Left] : flattening of the  nasolabial fold [No Muscle Atrophy] : normal bulk in all four extremities [Hemiparesis Of Left Side] : hemiparesis was present on the left [Pain / Temp Decrease Distal Extremities (Glove & Stocking)] : diminished stocking/glove distribution [Sensation Tactile Decrease] : light touch was intact [Vibration Decrease Leg / Foot Both Ankles] : decreased at both ankles [Vibration Decrease Leg / Foot Toes Both Feet] : decreased at the toes of both feet  [2+] : Brachioradialis right 2+ [3+] : Brachioradialis left 3+ [1+] : Patella left 1+ [0] : Ankle jerk left 0 [PERRL With Normal Accommodation] : pupils were equal in size, round, reactive to light, with normal accommodation [Extraocular Movements] : extraocular movements were intact [Full Visual Field] : full visual field [Outer Ear] : the ears and nose were normal in appearance [] : the neck was supple [Neck Cervical Mass (___cm)] : no neck mass was observed [FreeTextEntry1] : obese [Past-pointing] : there was no past-pointing [Tremor] : no tremor present [Plantar Reflex Right Only] : normal on the right [FreeTextEntry6] : Motor: Right upper and lower extremity 5/5, left spastic hemiparesis with significant restriction of left upper extremity proximal movements [Plantar Reflex Left Only] : normal on the left [FreeTextEntry8] : Unstable; Mild circumduction gait, Dyspnea after walking few feet

## 2024-03-25 NOTE — REVIEW OF SYSTEMS
[Memory Lapses or Loss] : memory loss [Arm Weakness] : arm weakness [Leg Weakness] : leg weakness [Poor Coordination] : poor coordination [Difficulty Walking] : difficulty walking [Earache] : earache [Constipation] : constipation [Joint Pain] : joint pain [Negative] : Heme/Lymph [As Noted in HPI] : as noted in HPI [Shortness Of Breath] : shortness of breath [Dizziness] : no dizziness [Vertigo] : no vertigo [de-identified] : Difficulty walking due to knee pain

## 2024-03-25 NOTE — DISCUSSION/SUMMARY
[FreeTextEntry1] : 84 -year-old HF with PMH of HLD, HTN, hypothyroidism, CVA in 2013 with residuall left HP, presented with intermittent dizziness, left ear pain, abnormal VNG suggestive of central etiology.  #Unstable gait, difficulty walking due to knee pains, also gets Dyspneic  #Mild cognitive impairment, mildly low VIT B12   - Strat B12- 1000 MCG Daily - Cognitive testing at next visit - Have recommended following up with PMD to have shortness of breath checked  # Dizziness / intermittent - most likely vestibular dysfunction, resolved - MRI/MRA brain not c/w VBI  # History of right corona radiata infarct with left hemiparesis ; multiple lacunar infarcts noted on MRI brain 3/7/20  # Risk factors: HTN,  DM, HLD, PPM  -Have recommended continuing  aspirin 81 mgs,  - Atorvastatin 10 mg daily - Diabetic control

## 2024-03-25 NOTE — DATA REVIEWED
[de-identified] : 1/6/2024: B12 347, TSH normal, A1c 7.2 6/7/22: CT scan of head, no acute lesions. /28/20: A1c 7.4, B12 283, total cholesterol 196, HDL 46,  1/30/20: VNG - suggestive of central pathology [de-identified] : 3/7/20: MRI brain reveals numerous bilateral chronic lacunar infarcts, no evidence of acute stroke. MRA brain essentially unremarkable\par  2/25/13: MRI brain with/without contrast: acute lacunar infarction in the posterior right corona radiata extending into the right cortico- spinal tract. Scattered punctate foci of hemosiderin in the left cerebellum and bilateral thalami likely due to prior subclinical hypertensive microhemorrhages, old lacunar infarction in the left cerebellum and thalami

## 2024-04-06 ENCOUNTER — APPOINTMENT (OUTPATIENT)
Dept: FAMILY MEDICINE | Facility: CLINIC | Age: 84
End: 2024-04-06
Payer: MEDICARE

## 2024-04-06 VITALS
BODY MASS INDEX: 33.99 KG/M2 | SYSTOLIC BLOOD PRESSURE: 132 MMHG | HEART RATE: 77 BPM | WEIGHT: 180 LBS | DIASTOLIC BLOOD PRESSURE: 80 MMHG | HEIGHT: 61 IN | OXYGEN SATURATION: 97 %

## 2024-04-06 DIAGNOSIS — R13.10 DYSPHAGIA, UNSPECIFIED: ICD-10-CM

## 2024-04-06 DIAGNOSIS — E78.5 HYPERLIPIDEMIA, UNSPECIFIED: ICD-10-CM

## 2024-04-06 DIAGNOSIS — E55.9 VITAMIN D DEFICIENCY, UNSPECIFIED: ICD-10-CM

## 2024-04-06 DIAGNOSIS — R05.9 COUGH, UNSPECIFIED: ICD-10-CM

## 2024-04-06 PROCEDURE — G2211 COMPLEX E/M VISIT ADD ON: CPT

## 2024-04-06 PROCEDURE — 36415 COLL VENOUS BLD VENIPUNCTURE: CPT

## 2024-04-06 PROCEDURE — 81003 URINALYSIS AUTO W/O SCOPE: CPT | Mod: QW

## 2024-04-06 PROCEDURE — 99214 OFFICE O/P EST MOD 30 MIN: CPT

## 2024-04-06 RX ORDER — METFORMIN ER 500 MG 500 MG/1
500 TABLET ORAL
Qty: 180 | Refills: 0 | Status: ACTIVE | COMMUNITY
Start: 2022-06-01

## 2024-04-06 RX ORDER — ERGOCALCIFEROL 1.25 MG/1
1.25 MG CAPSULE, LIQUID FILLED ORAL
Qty: 12 | Refills: 1 | Status: ACTIVE | COMMUNITY
Start: 2022-09-14

## 2024-04-06 NOTE — HISTORY OF PRESENT ILLNESS
[Diabetes Mellitus] : Diabetes Mellitus [FreeTextEntry6] : daughter present at visit, states she is noting that patient coughs alot after eating or dinking and sometimes even while talking

## 2024-04-08 LAB
ALBUMIN SERPL ELPH-MCNC: 4.3 G/DL
ALP BLD-CCNC: 95 U/L
ALT SERPL-CCNC: 9 U/L
ANION GAP SERPL CALC-SCNC: 10 MMOL/L
APPEARANCE: CLEAR
AST SERPL-CCNC: 15 U/L
BACTERIA: ABNORMAL /HPF
BILIRUB SERPL-MCNC: 0.3 MG/DL
BILIRUB UR QL STRIP: NEGATIVE
BILIRUBIN URINE: NEGATIVE
BLOOD URINE: NEGATIVE
BUN SERPL-MCNC: 9 MG/DL
CALCIUM SERPL-MCNC: 9.9 MG/DL
CAST: 1 /LPF
CHLORIDE SERPL-SCNC: 100 MMOL/L
CHOLEST SERPL-MCNC: 117 MG/DL
CLARITY UR: CLEAR
CO2 SERPL-SCNC: 28 MMOL/L
COARSE GRANULAR CASTS: PRESENT
COLLECTION METHOD: NORMAL
COLOR: YELLOW
CREAT SERPL-MCNC: 0.61 MG/DL
CREAT SPEC-SCNC: 40 MG/DL
EGFR: 89 ML/MIN/1.73M2
EPITHELIAL CELLS: 12 /HPF
ESTIMATED AVERAGE GLUCOSE: 157 MG/DL
GLUCOSE QUALITATIVE U: NEGATIVE MG/DL
GLUCOSE SERPL-MCNC: 134 MG/DL
GLUCOSE UR-MCNC: NEGATIVE
HBA1C MFR BLD HPLC: 7.1 %
HCG UR QL: 0.2 EU/DL
HCT VFR BLD CALC: 40.5 %
HDLC SERPL-MCNC: 43 MG/DL
HGB BLD-MCNC: 12.3 G/DL
HGB UR QL STRIP.AUTO: NEGATIVE
KETONES UR-MCNC: NEGATIVE
KETONES URINE: NEGATIVE MG/DL
LDLC SERPL CALC-MCNC: 53 MG/DL
LEUKOCYTE ESTERASE UR QL STRIP: NORMAL
LEUKOCYTE ESTERASE URINE: ABNORMAL
MCHC RBC-ENTMCNC: 25.8 PG
MCHC RBC-ENTMCNC: 30.4 GM/DL
MCV RBC AUTO: 84.9 FL
MICROALBUMIN 24H UR DL<=1MG/L-MCNC: <1.2 MG/DL
MICROALBUMIN/CREAT 24H UR-RTO: NORMAL MG/G
MICROSCOPIC-UA: NORMAL
NITRITE UR QL STRIP: NEGATIVE
NITRITE URINE: NEGATIVE
NONHDLC SERPL-MCNC: 74 MG/DL
PH UR STRIP: 7
PH URINE: 7
PLATELET # BLD AUTO: 311 K/UL
POTASSIUM SERPL-SCNC: 4.8 MMOL/L
PROT SERPL-MCNC: 7.1 G/DL
PROT UR STRIP-MCNC: NEGATIVE
PROTEIN URINE: NEGATIVE MG/DL
RBC # BLD: 4.77 M/UL
RBC # FLD: 15.8 %
RED BLOOD CELLS URINE: NORMAL /HPF
REVIEW: NORMAL
SODIUM SERPL-SCNC: 139 MMOL/L
SP GR UR STRIP: 1.01
SPECIFIC GRAVITY URINE: 1.01
T3FREE SERPL-MCNC: 2.78 PG/ML
T4 FREE SERPL-MCNC: 1.8 NG/DL
TRIGL SERPL-MCNC: 113 MG/DL
TSH SERPL-ACNC: 0.53 UIU/ML
UROBILINOGEN URINE: 0.2 MG/DL
WBC # FLD AUTO: 7.55 K/UL
WHITE BLOOD CELLS URINE: 8 /HPF

## 2024-04-23 ENCOUNTER — RX RENEWAL (OUTPATIENT)
Age: 84
End: 2024-04-23

## 2024-04-24 RX ORDER — ATORVASTATIN CALCIUM 10 MG/1
10 TABLET, FILM COATED ORAL
Qty: 90 | Refills: 0 | Status: ACTIVE | COMMUNITY
Start: 2020-02-25 | End: 1900-01-01

## 2024-04-24 RX ORDER — LEVOTHYROXINE SODIUM 0.09 MG/1
88 TABLET ORAL
Qty: 90 | Refills: 0 | Status: ACTIVE | COMMUNITY
Start: 2022-08-12 | End: 1900-01-01

## 2024-04-24 RX ORDER — ENALAPRIL MALEATE 10 MG/1
10 TABLET ORAL DAILY
Qty: 90 | Refills: 0 | Status: ACTIVE | COMMUNITY
Start: 2021-09-03 | End: 1900-01-01

## 2024-05-20 ENCOUNTER — OUTPATIENT (OUTPATIENT)
Dept: OUTPATIENT SERVICES | Facility: HOSPITAL | Age: 84
LOS: 1 days | End: 2024-05-20
Payer: MEDICARE

## 2024-05-20 DIAGNOSIS — R05.9 COUGH, UNSPECIFIED: ICD-10-CM

## 2024-05-20 PROCEDURE — 74230 X-RAY XM SWLNG FUNCJ C+: CPT | Mod: 26

## 2024-05-20 PROCEDURE — 74230 X-RAY XM SWLNG FUNCJ C+: CPT

## 2024-05-20 PROCEDURE — 92611 MOTION FLUOROSCOPY/SWALLOW: CPT | Mod: GN

## 2024-05-20 NOTE — SWALLOW VFSS/MBS ASSESSMENT ADULT - PHARYNGEAL PHASE COMMENTS
Swallow triggered in an acceptable time frame for age. Hyolaryngeal excursion and epiglottic retroflexion during the swallow were within functional parameters for age as well, with sufficient prandial airway protection. Pharyngeal motility was functional for age as well. Cricopharyngeal sphincter opening is unremarkable. NO LARYNGEAL PENETRATION, ASPIRATION, PHARYNGEAL RETENTION OR LARYNGOPHARYNGEAL REFLUX WERE IDENTIFIED UNDER FLUOROSCOPIC CONTROL.

## 2024-05-20 NOTE — SWALLOW VFSS/MBS ASSESSMENT ADULT - COMMENTS
The patient was referred for an outpatient Modified Barium Swallowing Study(MBS) by Dr Quintanilla. The patient is Venezuelan speaking and her  served as the historian. Medical history is reportedly remarkable for DM, "a thyroid problem" and "pacemaker" placement. The patient is reportedly on a regular consistency diet. The patient's  stated that the pt randomly coughs when eating but coughing episodes are not food consistency or situation specific. Odynophagia and unintentional weight loss denied.  stated that the patient never required the need for a Heimlich Maneuver.

## 2024-05-20 NOTE — SWALLOW VFSS/MBS ASSESSMENT ADULT - ADDITIONAL RECOMMENDATIONS
1) SUGGEST A REGULAR CONSISTENCY DIET WITH THIN LIQUID TEXTURES AS THE PATIENT APPEARED CLINICALLY TOLERANT OF THESE FOOD CONSISTENCIES FROM AN OROPHARYNGEAL SWALLOWING PERSPECTIVE ON EXAM.     2) NO NEED FOR SWALLOWING THERAPY BY A SPEECH PATHOLOGIST.     3) MEDICAL F/U AT DISCRETION OF DR NICHOLS. MBS NORMAL.     4) RECONSULT MARTINEZ PAN MA, [facility]  , SPEECH PATHOLOGY   [phone]

## 2024-05-20 NOTE — SWALLOW VFSS/MBS ASSESSMENT ADULT - CONSISTENCIES ADMINISTERED
"Subjective   Germain Masterson is a 61 y.o. male who presents for ER follow up of right groin pain.     History of Present Illness     The patient was seen at [facility] ER on 12/26/19 for right inguinal pain. Patient underwent CT of the pelvis, which demonstrated what looked to be a small right inguinal hernia. Patient reports that his symptoms present after being on his feet for a long time. He sometimes feels a bulge in his right groin if he walks a lot. He reports that his bulge is reducible, although it feels sore when he reduces it. Patient denies nausea, vomiting, or constipation. The patient is a smoker; he smokes 6 cigars daily.    The following portions of the patient's history were reviewed and updated as appropriate: allergies, current medications, past social history and problem list.    Review of Systems   Constitutional: Negative for activity change, appetite change, chills and fever.   HENT: Negative for rhinorrhea, sneezing and sore throat.    Eyes: Negative for discharge and visual disturbance.   Respiratory: Negative for cough, shortness of breath and wheezing.    Cardiovascular: Negative for chest pain, palpitations and leg swelling.   Gastrointestinal: Negative for abdominal pain, constipation, diarrhea, nausea and vomiting.   Genitourinary: Negative for difficulty urinating and dysuria.        Groin pain.   Musculoskeletal: Negative for joint swelling and myalgias.   Skin: Negative for rash and wound.   Neurological: Negative for dizziness and syncope.   Psychiatric/Behavioral: Negative for confusion and sleep disturbance.     Objective   /98 (BP Location: Left arm, Patient Position: Sitting)   Pulse 92   Temp 97.6 °F (36.4 °C) (Tympanic)   Ht 170.2 cm (67\")   Wt 84.8 kg (186 lb 14.4 oz)   SpO2 98%   BMI 29.27 kg/m²     Physical Exam   Constitutional: He is oriented to person, place, and time. He appears well-developed and well-nourished. No distress.   HENT: " All PO was either coated or injected with barium for contrast purposes./thin liquid/pureed/regular solid   Head: Normocephalic and atraumatic.   Nose: Nose normal.   Eyes: Conjunctivae and EOM are normal. Right eye exhibits no discharge. Left eye exhibits no discharge. No scleral icterus.   Neck: Normal range of motion. Neck supple.   Cardiovascular: Normal rate, regular rhythm and normal heart sounds.   No murmur heard.  Pulmonary/Chest: Effort normal and breath sounds normal. No respiratory distress. He has no wheezes. He has no rales.   Abdominal: Soft. Bowel sounds are normal. He exhibits no distension. There is no tenderness.   Genitourinary:   Genitourinary Comments: 3-4 cm reducible bulge in the right groin.   Musculoskeletal: He exhibits no edema.   Neurological: He is alert and oriented to person, place, and time.   Skin: Skin is warm. No rash noted. He is not diaphoretic. No erythema.   Psychiatric: He has a normal mood and affect. His behavior is normal.   Vitals reviewed.    Assessment/Plan   Germain was seen today for right inguinal pain.    Diagnoses and all orders for this visit:    Hospital discharge follow-up    Inguinal hernia of right side without obstruction or gangrene  -     Will provide patient's refill of Mobic.  He is advised to supplement with Tylenol as needed for pain control.  He is advised to not take more than 3000 mg of Tylenol in a 24-hour period.  Will also refer to general surgery for evaluation of his hernia.  He is advised to quit smoking, as they are unlikely to perform the surgery if he is an active smoker due to risk of recurrence.  -     meloxicam (MOBIC) 15 MG tablet; Take 1 tablet by mouth Daily.  -     Ambulatory Referral to General Surgery      Return in about 4 weeks (around 1/27/2020) for Recheck of hernia.      Goals        Patient Stated    • Less pain (pt-stated)      Barriers to Goals: None.            Preventative:  Health Maintenance   Topic Date Due   • ANNUAL PHYSICAL  11/28/1961   • PNEUMOCOCCAL VACCINE (19-64 MEDIUM RISK) (1 of 1 - PPSV23) 11/28/1979   • ZOSTER  VACCINE (1 of 2) 11/28/2010   • INFLUENZA VACCINE  08/01/2019   • HEPATITIS C SCREENING  12/30/2019   • COLONOSCOPY  12/30/2019   • TDAP/TD VACCINES (2 - Td) 12/26/2029       Patient's Body mass index is 29.27 kg/m². BMI is above normal parameters. Recommendations include: exercise counseling and nutrition counseling.      RISK SCORE: 3      Lindy Mora M.D. PGY3  [facility]  [address] [place] [address]  Office: [phone]      This document has been electronically signed by Lindy Mora MD on December 30, 2019 4:27 PM

## 2024-05-20 NOTE — SWALLOW VFSS/MBS ASSESSMENT ADULT - DIAGNOSTIC IMPRESSIONS
THE PATIENT DEMONSTRATES FUNCTIONAL OROPHARYNGEAL SWALLOWING INTEGRITY FOR AGE. NO LARYNGEAL PENETRATION, ASPIRATION, PHARYNGEAL RETENTION OR LARYNGOPHARYNGEAL REFLUX WERE IDENTIFIED UNDER FLUOROSCOPIC CONTROL.

## 2024-05-20 NOTE — SWALLOW VFSS/MBS ASSESSMENT ADULT - ORAL PHASE COMMENTS
Bolus formation/transfer were achieved via functional AP lingual actions and functional rotary masticatory motions. The patient was able to clear oral debris on her own.

## 2024-05-21 DIAGNOSIS — R05.9 COUGH, UNSPECIFIED: ICD-10-CM

## 2024-06-03 ENCOUNTER — APPOINTMENT (OUTPATIENT)
Dept: UROLOGY | Facility: CLINIC | Age: 84
End: 2024-06-03
Payer: MEDICARE

## 2024-06-03 VITALS
OXYGEN SATURATION: 98 % | RESPIRATION RATE: 16 BRPM | WEIGHT: 180 LBS | BODY MASS INDEX: 33.99 KG/M2 | HEIGHT: 61 IN | SYSTOLIC BLOOD PRESSURE: 154 MMHG | DIASTOLIC BLOOD PRESSURE: 83 MMHG | HEART RATE: 86 BPM

## 2024-06-03 DIAGNOSIS — E11.9 TYPE 2 DIABETES MELLITUS W/OUT COMPLICATIONS: ICD-10-CM

## 2024-06-03 DIAGNOSIS — N39.41 URGE INCONTINENCE: ICD-10-CM

## 2024-06-03 DIAGNOSIS — R10.2 PELVIC AND PERINEAL PAIN: ICD-10-CM

## 2024-06-03 DIAGNOSIS — R32 UNSPECIFIED URINARY INCONTINENCE: ICD-10-CM

## 2024-06-03 DIAGNOSIS — R39.89 OTHER SYMPTOMS AND SIGNS INVOLVING THE GENITOURINARY SYSTEM: ICD-10-CM

## 2024-06-03 PROCEDURE — 99214 OFFICE O/P EST MOD 30 MIN: CPT

## 2024-06-03 PROCEDURE — 81003 URINALYSIS AUTO W/O SCOPE: CPT | Mod: QW

## 2024-06-03 RX ORDER — SOLIFENACIN SUCCINATE 5 MG/1
5 TABLET ORAL
Qty: 90 | Refills: 2 | Status: ACTIVE | COMMUNITY
Start: 2023-08-07 | End: 1900-01-01

## 2024-06-03 NOTE — ASSESSMENT
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 Restart Solfiencin if needed. RX sent Unable to give urine today. Remains asymptomatic.   will follow-up as needed.

## 2024-06-03 NOTE — HISTORY OF PRESENT ILLNESS
[FreeTextEntry1] : HETAL CATHERINE  84 year F presents for follow up. Offers no complaints Had UTI in [facility] for lack of hydration in the heat. Treated with several abx. Currently asymptomatic. Has stopped using Solifenacin for the past month with good control. Accompanied by daughter.  Denies bladder pain.   Hg A1c 7.1% BUN/ CR 9/0.6  Patient wishes to speak in native language of Nepalese without .

## 2024-07-17 ENCOUNTER — RX RENEWAL (OUTPATIENT)
Age: 84
End: 2024-07-17

## 2024-07-31 ENCOUNTER — RX RENEWAL (OUTPATIENT)
Age: 84
End: 2024-07-31

## 2024-08-07 ENCOUNTER — RX RENEWAL (OUTPATIENT)
Age: 84
End: 2024-08-07

## 2024-10-01 ENCOUNTER — OFFICE (OUTPATIENT)
Dept: URBAN - METROPOLITAN AREA CLINIC 102 | Facility: CLINIC | Age: 84
Setting detail: OPHTHALMOLOGY
End: 2024-10-01
Payer: MEDICARE

## 2024-10-01 ENCOUNTER — RX ONLY (RX ONLY)
Age: 84
End: 2024-10-01

## 2024-10-01 DIAGNOSIS — H35.3112: ICD-10-CM

## 2024-10-01 DIAGNOSIS — H35.3121: ICD-10-CM

## 2024-10-01 DIAGNOSIS — H35.363: ICD-10-CM

## 2024-10-01 DIAGNOSIS — H25.13: ICD-10-CM

## 2024-10-01 DIAGNOSIS — H16.223: ICD-10-CM

## 2024-10-01 DIAGNOSIS — H47.391: ICD-10-CM

## 2024-10-01 DIAGNOSIS — H11.151: ICD-10-CM

## 2024-10-01 DIAGNOSIS — E11.9: ICD-10-CM
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
## 2024-10-01 PROCEDURE — 55555 MISCELLANEOUS CHARGES: CPT | Performed by: OPHTHALMOLOGY

## 2024-10-01 PROCEDURE — 92134 CPTRZ OPH DX IMG PST SGM RTA: CPT | Performed by: OPHTHALMOLOGY

## 2024-10-01 PROCEDURE — 99214 OFFICE O/P EST MOD 30 MIN: CPT | Performed by: OPHTHALMOLOGY

## 2024-10-01 ASSESSMENT — REFRACTION_CURRENTRX
OD_SPHERE: +1.75
OS_VPRISM_DIRECTION: BF
OS_SPHERE: +2.00
OD_OVR_VA: 20/
OS_ADD: +3.25
OS_CYLINDER: -1.50
OS_OVR_VA: 20/
OS_AXIS: 90
OD_ADD: +3.25
OD_AXIS: 76
OD_VPRISM_DIRECTION: BF
OD_CYLINDER: -1.00

## 2024-10-01 ASSESSMENT — TONOMETRY
OS_IOP_MMHG: 19
OD_IOP_MMHG: 20

## 2024-10-01 ASSESSMENT — REFRACTION_MANIFEST
OD_ADD: +2.50
OS_ADD: +2.50
OS_VA1: 20/30+
OS_SPHERE: +3.00
OS_CYLINDER: -2.50
OD_SPHERE: +2.25
OD_VA1: 20/30-
OD_CYLINDER: -2.00
OD_AXIS: 90
OS_AXIS: 902

## 2024-10-01 ASSESSMENT — KERATOMETRY
METHOD_AUTO_MANUAL: AUTO
OD_K1POWER_DIOPTERS: 45.00
OD_K2POWER_DIOPTERS: 46.25
OD_AXISANGLE_DEGREES: 147
OS_K1POWER_DIOPTERS: 44.00
OS_K2POWER_DIOPTERS: 45.75
OS_AXISANGLE_DEGREES: 174

## 2024-10-01 ASSESSMENT — VISUAL ACUITY
OD_BCVA: 20/40
OS_BCVA: 20/50-1

## 2024-10-01 ASSESSMENT — REFRACTION_AUTOREFRACTION
OS_CYLINDER: -3.00
OD_SPHERE: +2.50
OD_AXIS: 87
OS_SPHERE: +3.50
OS_AXIS: 90
OD_CYLINDER: -2.00

## 2024-10-01 ASSESSMENT — CONFRONTATIONAL VISUAL FIELD TEST (CVF)
OS_FINDINGS: FULL
OD_FINDINGS: FULL

## 2024-10-05 ENCOUNTER — APPOINTMENT (OUTPATIENT)
Dept: FAMILY MEDICINE | Facility: CLINIC | Age: 84
End: 2024-10-05
Payer: MEDICARE

## 2024-10-05 DIAGNOSIS — E78.5 HYPERLIPIDEMIA, UNSPECIFIED: ICD-10-CM

## 2024-10-05 DIAGNOSIS — E03.9 HYPOTHYROIDISM, UNSPECIFIED: ICD-10-CM

## 2024-10-05 DIAGNOSIS — E11.9 TYPE 2 DIABETES MELLITUS W/OUT COMPLICATIONS: ICD-10-CM

## 2024-10-05 DIAGNOSIS — U07.1 COVID-19: ICD-10-CM

## 2024-10-05 DIAGNOSIS — M25.561 PAIN IN RIGHT KNEE: ICD-10-CM

## 2024-10-05 DIAGNOSIS — G89.29 PAIN IN RIGHT KNEE: ICD-10-CM

## 2024-10-05 DIAGNOSIS — I10 ESSENTIAL (PRIMARY) HYPERTENSION: ICD-10-CM

## 2024-10-05 DIAGNOSIS — Z76.89 PERSONS ENCOUNTERING HEALTH SERVICES IN OTHER SPECIFIED CIRCUMSTANCES: ICD-10-CM

## 2024-10-05 DIAGNOSIS — M25.562 PAIN IN RIGHT KNEE: ICD-10-CM

## 2024-10-05 PROCEDURE — G0439: CPT

## 2024-10-05 PROCEDURE — 99213 OFFICE O/P EST LOW 20 MIN: CPT | Mod: 25

## 2024-10-05 PROCEDURE — 93000 ELECTROCARDIOGRAM COMPLETE: CPT

## 2024-10-05 PROCEDURE — 81003 URINALYSIS AUTO W/O SCOPE: CPT | Mod: QW

## 2024-10-05 PROCEDURE — 36415 COLL VENOUS BLD VENIPUNCTURE: CPT

## 2024-10-05 NOTE — HEALTH RISK ASSESSMENT
[1 or 2 (0 pts)] : 1 or 2 (0 points) [Never (0 pts)] : Never (0 points) [No] : In the past 12 months have you used drugs other than those required for medical reasons? No [No falls in past year] : Patient reported no falls in the past year [0] : 2) Feeling down, depressed, or hopeless: Not at all (0) [PHQ-2 Negative - No further assessment needed] : PHQ-2 Negative - No further assessment needed [Never] : Never [NO] : No [HIV test declined] : HIV test declined [Hepatitis C test declined] : Hepatitis C test declined [With Family] : lives with family [# of Members in Household ___] :  household currently consist of [unfilled] member(s) [Unemployed] : unemployed [Less Than High School] : less than high school [] :  [# Of Children ___] : has [unfilled] children [Feels Safe at Home] : Feels safe at home [Fully functional (bathing, dressing, toileting, transferring, walking, feeding)] : Fully functional (bathing, dressing, toileting, transferring, walking, feeding) [Fully functional (using the telephone, shopping, preparing meals, housekeeping, doing laundry, using] : Fully functional and needs no help or supervision to perform IADLs (using the telephone, shopping, preparing meals, housekeeping, doing laundry, using transportation, managing medications and managing finances) [Reports normal functional visual acuity (ie: able to read med bottle)] : Reports normal functional visual acuity [Smoke Detector] : smoke detector [Carbon Monoxide Detector] : carbon monoxide detector [Seat Belt] :  uses seat belt [Travel to Developing Areas] : travel to developing areas [Audit-CScore] : 0 [[id number]] : 0 [Change in mental status noted] : No change in mental status noted [Language] : denies difficulty with language [Behavior] : denies difficulty with behavior [Learning/Retaining New Information] : denies difficulty learning/retaining new information [Handling Complex Tasks] : denies difficulty handling complex tasks [Reasoning] : denies difficulty with reasoning [Spatial Ability and Orientation] : denies difficulty with spatial ability and orientation [Reports changes in hearing] : Reports no changes in hearing [Reports changes in vision] : Reports no changes in vision [Reports changes in dental health] : Reports no changes in dental health [Sunscreen] : does not use sunscreen [TB Exposure] : is not being exposed to tuberculosis

## 2024-10-10 LAB
ALBUMIN SERPL ELPH-MCNC: 4.3 G/DL
ALP BLD-CCNC: 88 U/L
ALT SERPL-CCNC: 10 U/L
ANION GAP SERPL CALC-SCNC: 14 MMOL/L
AST SERPL-CCNC: 18 U/L
BASOPHILS # BLD AUTO: 0.05 K/UL
BASOPHILS NFR BLD AUTO: 0.7 %
BILIRUB SERPL-MCNC: 0.3 MG/DL
BILIRUB UR QL STRIP: NORMAL
BUN SERPL-MCNC: 7 MG/DL
CALCIUM SERPL-MCNC: 9.5 MG/DL
CHLORIDE SERPL-SCNC: 99 MMOL/L
CHOLEST SERPL-MCNC: 126 MG/DL
CO2 SERPL-SCNC: 26 MMOL/L
CREAT SERPL-MCNC: 0.64 MG/DL
EGFR: 88 ML/MIN/1.73M2
EOSINOPHIL # BLD AUTO: 0.11 K/UL
EOSINOPHIL NFR BLD AUTO: 1.5 %
GLUCOSE SERPL-MCNC: 134 MG/DL
GLUCOSE UR-MCNC: NORMAL
HCG UR QL: 0.2 EU/DL
HCT VFR BLD CALC: 40.4 %
HDLC SERPL-MCNC: 40 MG/DL
HGB BLD-MCNC: 12.3 G/DL
HGB UR QL STRIP.AUTO: NORMAL
IMM GRANULOCYTES NFR BLD AUTO: 0.3 %
KETONES UR-MCNC: NORMAL
LDLC SERPL CALC-MCNC: 55 MG/DL
LEUKOCYTE ESTERASE UR QL STRIP: ABNORMAL
LYMPHOCYTES # BLD AUTO: 1.58 K/UL
LYMPHOCYTES NFR BLD AUTO: 21.7 %
MAN DIFF?: NORMAL
MCHC RBC-ENTMCNC: 25.8 PG
MCHC RBC-ENTMCNC: 30.4 GM/DL
MCV RBC AUTO: 84.7 FL
MONOCYTES # BLD AUTO: 0.68 K/UL
MONOCYTES NFR BLD AUTO: 9.3 %
NEUTROPHILS # BLD AUTO: 4.84 K/UL
NEUTROPHILS NFR BLD AUTO: 66.5 %
NITRITE UR QL STRIP: NORMAL
NONHDLC SERPL-MCNC: 86 MG/DL
PH UR STRIP: 7
PLATELET # BLD AUTO: 298 K/UL
POTASSIUM SERPL-SCNC: 4.6 MMOL/L
PROT SERPL-MCNC: 7.1 G/DL
PROT UR STRIP-MCNC: NORMAL
RBC # BLD: 4.77 M/UL
RBC # FLD: 15.9 %
SODIUM SERPL-SCNC: 139 MMOL/L
SP GR UR STRIP: 1.01
T3FREE SERPL-MCNC: 2.44 PG/ML
T4 FREE SERPL-MCNC: 1.6 NG/DL
TRIGL SERPL-MCNC: 190 MG/DL
TSH SERPL-ACNC: 0.94 UIU/ML
WBC # FLD AUTO: 7.28 K/UL

## 2024-11-06 ENCOUNTER — RX RENEWAL (OUTPATIENT)
Age: 84
End: 2024-11-06

## 2025-01-02 ENCOUNTER — RX RENEWAL (OUTPATIENT)
Age: 85
End: 2025-01-02

## 2025-01-04 ENCOUNTER — APPOINTMENT (OUTPATIENT)
Dept: FAMILY MEDICINE | Facility: CLINIC | Age: 85
End: 2025-01-04
Payer: MEDICARE

## 2025-01-04 VITALS
BODY MASS INDEX: 34.74 KG/M2 | SYSTOLIC BLOOD PRESSURE: 144 MMHG | TEMPERATURE: 97.6 F | HEIGHT: 61 IN | DIASTOLIC BLOOD PRESSURE: 84 MMHG | OXYGEN SATURATION: 97 % | HEART RATE: 88 BPM | WEIGHT: 184 LBS

## 2025-01-04 DIAGNOSIS — E78.5 HYPERLIPIDEMIA, UNSPECIFIED: ICD-10-CM

## 2025-01-04 DIAGNOSIS — E03.9 HYPOTHYROIDISM, UNSPECIFIED: ICD-10-CM

## 2025-01-04 DIAGNOSIS — I10 ESSENTIAL (PRIMARY) HYPERTENSION: ICD-10-CM

## 2025-01-04 DIAGNOSIS — E11.9 TYPE 2 DIABETES MELLITUS W/OUT COMPLICATIONS: ICD-10-CM

## 2025-01-04 PROCEDURE — 99214 OFFICE O/P EST MOD 30 MIN: CPT

## 2025-01-04 PROCEDURE — 36415 COLL VENOUS BLD VENIPUNCTURE: CPT

## 2025-01-04 PROCEDURE — 81003 URINALYSIS AUTO W/O SCOPE: CPT | Mod: QW

## 2025-01-06 LAB
ALBUMIN SERPL ELPH-MCNC: 4.4 G/DL
ALP BLD-CCNC: 98 U/L
ALT SERPL-CCNC: 5 U/L
ANION GAP SERPL CALC-SCNC: 13 MMOL/L
APPEARANCE: CLEAR
AST SERPL-CCNC: 14 U/L
BACTERIA: NEGATIVE /HPF
BILIRUB SERPL-MCNC: 0.3 MG/DL
BILIRUBIN URINE: NEGATIVE
BLOOD URINE: NEGATIVE
BUN SERPL-MCNC: 9 MG/DL
CALCIUM SERPL-MCNC: 9.8 MG/DL
CHLORIDE SERPL-SCNC: 99 MMOL/L
CHOLEST SERPL-MCNC: 141 MG/DL
CO2 SERPL-SCNC: 27 MMOL/L
COLOR: YELLOW
CREAT SERPL-MCNC: 0.59 MG/DL
CREAT SPEC-SCNC: 65 MG/DL
EGFR: 90 ML/MIN/1.73M2
ESTIMATED AVERAGE GLUCOSE: 160 MG/DL
GLUCOSE QUALITATIVE U: NEGATIVE MG/DL
GLUCOSE SERPL-MCNC: 137 MG/DL
HBA1C MFR BLD HPLC: 7.2 %
HCT VFR BLD CALC: 39.9 %
HDLC SERPL-MCNC: 44 MG/DL
HGB BLD-MCNC: 12 G/DL
KETONES URINE: NEGATIVE MG/DL
LDLC SERPL CALC-MCNC: 68 MG/DL
LEUKOCYTE ESTERASE URINE: ABNORMAL
MCHC RBC-ENTMCNC: 25.5 PG
MCHC RBC-ENTMCNC: 30.1 G/DL
MCV RBC AUTO: 84.7 FL
MICROALBUMIN 24H UR DL<=1MG/L-MCNC: <1.2 MG/DL
MICROALBUMIN/CREAT 24H UR-RTO: NORMAL MG/G
MICROSCOPIC-UA: NORMAL
NITRITE URINE: NEGATIVE
NONHDLC SERPL-MCNC: 97 MG/DL
PH URINE: 7.5
PLATELET # BLD AUTO: 395 K/UL
POTASSIUM SERPL-SCNC: 4.9 MMOL/L
PROT SERPL-MCNC: 7.3 G/DL
PROTEIN URINE: NORMAL MG/DL
RBC # BLD: 4.71 M/UL
RBC # FLD: 16.8 %
RED BLOOD CELLS URINE: 1 /HPF
SODIUM SERPL-SCNC: 139 MMOL/L
SPECIFIC GRAVITY URINE: 1.01
SQUAMOUS EPITHELIAL CELLS: PRESENT
T3FREE SERPL-MCNC: 2.74 PG/ML
T4 FREE SERPL-MCNC: 1.5 NG/DL
TRIGL SERPL-MCNC: 166 MG/DL
TSH SERPL-ACNC: 0.84 UIU/ML
UROBILINOGEN URINE: 0.2 MG/DL
VIT B12 SERPL-MCNC: 409 PG/ML
WBC # FLD AUTO: 8.37 K/UL
WHITE BLOOD CELLS URINE: 0 /HPF

## 2025-01-21 ENCOUNTER — APPOINTMENT (OUTPATIENT)
Dept: NEUROLOGY | Facility: CLINIC | Age: 85
End: 2025-01-21
Payer: MEDICARE

## 2025-01-21 VITALS
HEIGHT: 60 IN | BODY MASS INDEX: 34.95 KG/M2 | HEART RATE: 77 BPM | DIASTOLIC BLOOD PRESSURE: 86 MMHG | WEIGHT: 178 LBS | SYSTOLIC BLOOD PRESSURE: 139 MMHG

## 2025-01-21 DIAGNOSIS — Z86.73 PERSONAL HISTORY OF TRANSIENT ISCHEMIC ATTACK (TIA), AND CEREBRAL INFARCTION W/OUT RESIDUAL DEFICITS: ICD-10-CM

## 2025-01-21 DIAGNOSIS — R42 DIZZINESS AND GIDDINESS: ICD-10-CM

## 2025-01-21 DIAGNOSIS — R26.89 OTHER ABNORMALITIES OF GAIT AND MOBILITY: ICD-10-CM

## 2025-01-21 DIAGNOSIS — R41.3 OTHER AMNESIA: ICD-10-CM

## 2025-01-21 PROCEDURE — 99214 OFFICE O/P EST MOD 30 MIN: CPT

## 2025-02-10 ENCOUNTER — RX RENEWAL (OUTPATIENT)
Age: 85
End: 2025-02-10

## 2025-04-02 ENCOUNTER — RX RENEWAL (OUTPATIENT)
Age: 85
End: 2025-04-02

## 2025-04-03 ENCOUNTER — OFFICE (OUTPATIENT)
Dept: URBAN - METROPOLITAN AREA CLINIC 102 | Facility: CLINIC | Age: 85
Setting detail: OPHTHALMOLOGY
End: 2025-04-03
Payer: MEDICARE

## 2025-04-03 DIAGNOSIS — E11.9: ICD-10-CM

## 2025-04-03 DIAGNOSIS — H35.3112: ICD-10-CM

## 2025-04-03 DIAGNOSIS — H52.4: ICD-10-CM

## 2025-04-03 DIAGNOSIS — H25.13: ICD-10-CM

## 2025-04-03 DIAGNOSIS — H35.3121: ICD-10-CM

## 2025-04-03 PROCEDURE — 99214 OFFICE O/P EST MOD 30 MIN: CPT | Performed by: OPHTHALMOLOGY

## 2025-04-03 PROCEDURE — 92015 DETERMINE REFRACTIVE STATE: CPT | Performed by: OPHTHALMOLOGY

## 2025-04-03 PROCEDURE — 92134 CPTRZ OPH DX IMG PST SGM RTA: CPT | Performed by: OPHTHALMOLOGY

## 2025-04-03 ASSESSMENT — REFRACTION_AUTOREFRACTION
OS_AXIS: 90
OS_CYLINDER: -2.75
OD_SPHERE: +3.00
OD_AXIS: 89
OS_SPHERE: +3.50
OD_CYLINDER: -2.50

## 2025-04-03 ASSESSMENT — REFRACTION_MANIFEST
OS_ADD: +2.50
OD_CYLINDER: -2.00
OD_ADD: +2.50
OS_CYLINDER: -2.50
OS_VA1: 20/30
OD_SPHERE: +2.25
OD_CYLINDER: -2.25
OS_SPHERE: +3.00
OD_AXIS: 090
OS_ADD: +2.50
OD_VA1: 20/30-
OD_AXIS: 90
OS_CYLINDER: -2.75
OD_SPHERE: +2.25
OS_SPHERE: +3.00
OS_AXIS: 902
OS_AXIS: 90
OS_VA1: 20/30+
OD_VA1: 20/40
OD_ADD: +2.50

## 2025-04-03 ASSESSMENT — REFRACTION_CURRENTRX
OS_SPHERE: +2.00
OD_CYLINDER: -1.00
OS_CYLINDER: -1.50
OS_ADD: +3.25
OD_ADD: +3.25
OD_SPHERE: +1.75
OD_OVR_VA: 20/
OD_VPRISM_DIRECTION: BF
OS_AXIS: 90
OD_AXIS: 76
OS_OVR_VA: 20/
OS_VPRISM_DIRECTION: BF

## 2025-04-03 ASSESSMENT — KERATOMETRY
OD_K1POWER_DIOPTERS: 44.25
OS_K2POWER_DIOPTERS: 45.50
OS_AXISANGLE_DEGREES: 001
OS_K1POWER_DIOPTERS: 43.75
METHOD_AUTO_MANUAL: AUTO
OD_K2POWER_DIOPTERS: 45.75
OD_AXISANGLE_DEGREES: 159

## 2025-04-03 ASSESSMENT — CONFRONTATIONAL VISUAL FIELD TEST (CVF)
OD_FINDINGS: FULL
OS_FINDINGS: FULL

## 2025-04-03 ASSESSMENT — VISUAL ACUITY
OS_BCVA: 20/50-1
OD_BCVA: 20/40

## 2025-04-03 ASSESSMENT — TONOMETRY
OD_IOP_MMHG: 21
OS_IOP_MMHG: 17

## 2025-04-05 ENCOUNTER — APPOINTMENT (OUTPATIENT)
Dept: FAMILY MEDICINE | Facility: CLINIC | Age: 85
End: 2025-04-05
Payer: MEDICARE

## 2025-04-05 VITALS
BODY MASS INDEX: 35.34 KG/M2 | DIASTOLIC BLOOD PRESSURE: 82 MMHG | WEIGHT: 180 LBS | HEART RATE: 71 BPM | SYSTOLIC BLOOD PRESSURE: 132 MMHG | OXYGEN SATURATION: 97 % | TEMPERATURE: 97.8 F | HEIGHT: 60 IN

## 2025-04-05 DIAGNOSIS — E78.5 HYPERLIPIDEMIA, UNSPECIFIED: ICD-10-CM

## 2025-04-05 DIAGNOSIS — I10 ESSENTIAL (PRIMARY) HYPERTENSION: ICD-10-CM

## 2025-04-05 DIAGNOSIS — E11.9 TYPE 2 DIABETES MELLITUS W/OUT COMPLICATIONS: ICD-10-CM

## 2025-04-05 LAB
BILIRUB UR QL STRIP: NEGATIVE
CLARITY UR: CLEAR
COLLECTION METHOD: NORMAL
GLUCOSE UR-MCNC: NEGATIVE
HCG UR QL: 0.2 EU/DL
HGB UR QL STRIP.AUTO: NEGATIVE
KETONES UR-MCNC: NEGATIVE
LEUKOCYTE ESTERASE UR QL STRIP: NORMAL
NITRITE UR QL STRIP: NEGATIVE
PH UR STRIP: 7
PROT UR STRIP-MCNC: NEGATIVE
SP GR UR STRIP: 1.01

## 2025-04-05 PROCEDURE — G2211 COMPLEX E/M VISIT ADD ON: CPT

## 2025-04-05 PROCEDURE — 36415 COLL VENOUS BLD VENIPUNCTURE: CPT

## 2025-04-05 PROCEDURE — 81003 URINALYSIS AUTO W/O SCOPE: CPT | Mod: QW

## 2025-04-05 PROCEDURE — 99214 OFFICE O/P EST MOD 30 MIN: CPT

## 2025-04-07 LAB
25(OH)D3 SERPL-MCNC: 20.9 NG/ML
ALBUMIN SERPL ELPH-MCNC: 4.3 G/DL
ALP BLD-CCNC: 94 U/L
ALT SERPL-CCNC: 7 U/L
ANION GAP SERPL CALC-SCNC: 12 MMOL/L
APPEARANCE: CLEAR
AST SERPL-CCNC: 13 U/L
BACTERIA: NEGATIVE /HPF
BILIRUB SERPL-MCNC: 0.3 MG/DL
BILIRUBIN URINE: NEGATIVE
BLOOD URINE: NEGATIVE
BUN SERPL-MCNC: 9 MG/DL
CALCIUM SERPL-MCNC: 9.8 MG/DL
CAST: 1 /LPF
CHLORIDE SERPL-SCNC: 99 MMOL/L
CHOLEST SERPL-MCNC: 141 MG/DL
CO2 SERPL-SCNC: 27 MMOL/L
COLOR: YELLOW
CREAT SERPL-MCNC: 0.58 MG/DL
EGFRCR SERPLBLD CKD-EPI 2021: 90 ML/MIN/1.73M2
EPITHELIAL CELLS: 10 /HPF
ESTIMATED AVERAGE GLUCOSE: 163 MG/DL
GLUCOSE QUALITATIVE U: NEGATIVE MG/DL
GLUCOSE SERPL-MCNC: 151 MG/DL
HBA1C MFR BLD HPLC: 7.3 %
HCT VFR BLD CALC: 37.5 %
HDLC SERPL-MCNC: 43 MG/DL
HGB BLD-MCNC: 11.5 G/DL
KETONES URINE: NEGATIVE MG/DL
LDLC SERPL-MCNC: 68 MG/DL
LEUKOCYTE ESTERASE URINE: ABNORMAL
MCHC RBC-ENTMCNC: 24.2 PG
MCHC RBC-ENTMCNC: 30.7 G/DL
MCV RBC AUTO: 78.9 FL
MICROSCOPIC-UA: NORMAL
NITRITE URINE: NEGATIVE
NONHDLC SERPL-MCNC: 98 MG/DL
PH URINE: 7.5
PLATELET # BLD AUTO: 318 K/UL
POTASSIUM SERPL-SCNC: 4.6 MMOL/L
PROT SERPL-MCNC: 7.2 G/DL
PROTEIN URINE: NEGATIVE MG/DL
RBC # BLD: 4.75 M/UL
RBC # FLD: 17 %
RED BLOOD CELLS URINE: 3 /HPF
REVIEW: NORMAL
SODIUM SERPL-SCNC: 138 MMOL/L
SPECIFIC GRAVITY URINE: 1.01
T3FREE SERPL-MCNC: 2.54 PG/ML
T4 FREE SERPL-MCNC: 1.7 NG/DL
TRIGL SERPL-MCNC: 179 MG/DL
TSH SERPL-ACNC: 0.51 UIU/ML
UROBILINOGEN URINE: 0.2 MG/DL
VIT B12 SERPL-MCNC: 363 PG/ML
WBC # FLD AUTO: 8.91 K/UL
WHITE BLOOD CELLS URINE: 2 /HPF
YEAST-LIKE CELLS: PRESENT

## 2025-06-28 ENCOUNTER — RX RENEWAL (OUTPATIENT)
Age: 85
End: 2025-06-28

## 2025-07-16 ENCOUNTER — RX RENEWAL (OUTPATIENT)
Age: 85
End: 2025-07-16

## 2025-07-26 ENCOUNTER — APPOINTMENT (OUTPATIENT)
Dept: FAMILY MEDICINE | Facility: CLINIC | Age: 85
End: 2025-07-26
Payer: MEDICARE

## 2025-07-26 VITALS
TEMPERATURE: 98.4 F | WEIGHT: 178 LBS | HEART RATE: 93 BPM | DIASTOLIC BLOOD PRESSURE: 60 MMHG | HEIGHT: 60 IN | BODY MASS INDEX: 34.95 KG/M2 | SYSTOLIC BLOOD PRESSURE: 110 MMHG | OXYGEN SATURATION: 96 %

## 2025-07-26 DIAGNOSIS — E78.5 HYPERLIPIDEMIA, UNSPECIFIED: ICD-10-CM

## 2025-07-26 DIAGNOSIS — I10 ESSENTIAL (PRIMARY) HYPERTENSION: ICD-10-CM

## 2025-07-26 DIAGNOSIS — E03.9 HYPOTHYROIDISM, UNSPECIFIED: ICD-10-CM

## 2025-07-26 LAB
BILIRUB UR QL STRIP: NEGATIVE
CLARITY UR: CLEAR
COLLECTION METHOD: NORMAL
GLUCOSE UR-MCNC: NEGATIVE
HCG UR QL: 0.2 EU/DL
HGB UR QL STRIP.AUTO: NEGATIVE
KETONES UR-MCNC: NEGATIVE
LEUKOCYTE ESTERASE UR QL STRIP: NEGATIVE
NITRITE UR QL STRIP: NEGATIVE
PH UR STRIP: 7
PROT UR STRIP-MCNC: NEGATIVE
SP GR UR STRIP: 1.01

## 2025-07-26 PROCEDURE — 99214 OFFICE O/P EST MOD 30 MIN: CPT

## 2025-07-26 PROCEDURE — 81003 URINALYSIS AUTO W/O SCOPE: CPT | Mod: QW

## 2025-07-26 PROCEDURE — 36415 COLL VENOUS BLD VENIPUNCTURE: CPT

## 2025-07-26 PROCEDURE — G2211 COMPLEX E/M VISIT ADD ON: CPT

## 2025-08-04 LAB
ALBUMIN SERPL ELPH-MCNC: 4.3 G/DL
ALBUMIN, RANDOM URINE: <1.2 MG/DL
ALP BLD-CCNC: 109 U/L
ALT SERPL-CCNC: 9 U/L
ANION GAP SERPL CALC-SCNC: 13 MMOL/L
APPEARANCE: CLEAR
AST SERPL-CCNC: 12 U/L
BACTERIA: NEGATIVE /HPF
BILIRUB SERPL-MCNC: 0.3 MG/DL
BILIRUBIN URINE: NEGATIVE
BLOOD URINE: NEGATIVE
BUN SERPL-MCNC: 7 MG/DL
CALCIUM SERPL-MCNC: 9.7 MG/DL
CAST: 0 /LPF
CHLORIDE SERPL-SCNC: 101 MMOL/L
CHOLEST SERPL-MCNC: 147 MG/DL
CO2 SERPL-SCNC: 25 MMOL/L
COLOR: YELLOW
CREAT SERPL-MCNC: 0.55 MG/DL
CREAT SPEC-SCNC: 85 MG/DL
EGFRCR SERPLBLD CKD-EPI 2021: 91 ML/MIN/1.73M2
EPITHELIAL CELLS: 2 /HPF
ESTIMATED AVERAGE GLUCOSE: 171 MG/DL
GLUCOSE QUALITATIVE U: NEGATIVE MG/DL
GLUCOSE SERPL-MCNC: 162 MG/DL
HBA1C MFR BLD HPLC: 7.6 %
HCT VFR BLD CALC: 39.7 %
HDLC SERPL-MCNC: 46 MG/DL
HGB BLD-MCNC: 11.6 G/DL
KETONES URINE: NEGATIVE MG/DL
LDLC SERPL-MCNC: 75 MG/DL
LEUKOCYTE ESTERASE URINE: NEGATIVE
MCHC RBC-ENTMCNC: 23.7 PG
MCHC RBC-ENTMCNC: 29.2 G/DL
MCV RBC AUTO: 81 FL
MICROALBUMIN/CREAT 24H UR-RTO: NORMAL MG/G
MICROSCOPIC-UA: NORMAL
NITRITE URINE: NEGATIVE
NONHDLC SERPL-MCNC: 101 MG/DL
PH URINE: 7.5
PLATELET # BLD AUTO: 370 K/UL
POTASSIUM SERPL-SCNC: 4.9 MMOL/L
PROT SERPL-MCNC: 7.6 G/DL
PROTEIN URINE: NEGATIVE MG/DL
RBC # BLD: 4.9 M/UL
RBC # FLD: 18.6 %
RED BLOOD CELLS URINE: 1 /HPF
SODIUM SERPL-SCNC: 139 MMOL/L
SPECIFIC GRAVITY URINE: 1.01
T3FREE SERPL-MCNC: 2.57 PG/ML
T4 FREE SERPL-MCNC: 1.7 NG/DL
TRIGL SERPL-MCNC: 149 MG/DL
TSH SERPL-ACNC: 1.08 UIU/ML
UROBILINOGEN URINE: 0.2 MG/DL
WBC # FLD AUTO: 10.52 K/UL
WHITE BLOOD CELLS URINE: 0 /HPF